# Patient Record
Sex: FEMALE | Race: WHITE | NOT HISPANIC OR LATINO | Employment: FULL TIME | URBAN - NONMETROPOLITAN AREA
[De-identification: names, ages, dates, MRNs, and addresses within clinical notes are randomized per-mention and may not be internally consistent; named-entity substitution may affect disease eponyms.]

---

## 2017-03-17 RX ORDER — AZITHROMYCIN 250 MG/1
TABLET, FILM COATED ORAL
Qty: 6 TABLET | Refills: 0 | Status: SHIPPED | OUTPATIENT
Start: 2017-03-17 | End: 2018-01-26

## 2017-07-31 RX ORDER — FLUCONAZOLE 150 MG/1
150 TABLET ORAL DAILY
Qty: 3 TABLET | Refills: 0 | Status: SHIPPED | OUTPATIENT
Start: 2017-07-31 | End: 2018-01-26

## 2018-01-26 ENCOUNTER — OFFICE VISIT (OUTPATIENT)
Dept: FAMILY MEDICINE CLINIC | Facility: CLINIC | Age: 38
End: 2018-01-26

## 2018-01-26 VITALS
WEIGHT: 193 LBS | BODY MASS INDEX: 32.95 KG/M2 | SYSTOLIC BLOOD PRESSURE: 124 MMHG | HEIGHT: 64 IN | DIASTOLIC BLOOD PRESSURE: 84 MMHG | HEART RATE: 80 BPM | TEMPERATURE: 97.9 F | OXYGEN SATURATION: 99 %

## 2018-01-26 DIAGNOSIS — F33.0 MILD EPISODE OF RECURRENT MAJOR DEPRESSIVE DISORDER (HCC): ICD-10-CM

## 2018-01-26 DIAGNOSIS — G47.00 INSOMNIA, UNSPECIFIED TYPE: ICD-10-CM

## 2018-01-26 DIAGNOSIS — F43.10 PTSD (POST-TRAUMATIC STRESS DISORDER): ICD-10-CM

## 2018-01-26 PROCEDURE — 99214 OFFICE O/P EST MOD 30 MIN: CPT | Performed by: NURSE PRACTITIONER

## 2018-01-26 RX ORDER — TRAZODONE HYDROCHLORIDE 50 MG/1
50 TABLET ORAL NIGHTLY
Qty: 30 TABLET | Refills: 1 | Status: SHIPPED | OUTPATIENT
Start: 2018-01-26 | End: 2018-05-01 | Stop reason: SDUPTHER

## 2018-01-26 RX ORDER — CITALOPRAM 10 MG/1
10 TABLET ORAL DAILY
Qty: 30 TABLET | Refills: 1 | Status: SHIPPED | OUTPATIENT
Start: 2018-01-26 | End: 2018-02-27 | Stop reason: SDUPTHER

## 2018-01-26 NOTE — PROGRESS NOTES
"Subjective   Priscilla Brooke is a 37 y.o. female.     HPI Comments: Patient is a 37 year old  female here for a follow-up concerning her depression. Patient reports that her depression is worsening and so is her PTSD. She states she has never reported that she has PTSD, but it she does, due to childhood trauma. States she has been having nightmares lately. States her  confirms this. She would like to try a medication that would help her sleep and continue to help her depression symptoms during the day.        The following portions of the patient's history were reviewed and updated as appropriate: allergies, current medications, past family history, past medical history, past social history, past surgical history and problem list.    Review of Systems   Constitutional: Negative.    HENT: Negative.    Eyes: Negative.    Respiratory: Negative.    Cardiovascular: Negative.    Gastrointestinal: Negative.    Endocrine: Negative.    Genitourinary: Negative.    Musculoskeletal: Negative.         NROM of all major joints   Skin: Negative.    Allergic/Immunologic: Negative.    Neurological: Negative.    Hematological: Negative.    Psychiatric/Behavioral: Positive for dysphoric mood and sleep disturbance. Negative for self-injury. The patient is not nervous/anxious.         Reports nightmares in the recent past, trouble sleeping and increase depression       Objective    Blood pressure 124/84, pulse 80, temperature 97.9 °F (36.6 °C), height 162.6 cm (64\"), weight 87.5 kg (193 lb), SpO2 99 %.    Physical Exam   Constitutional: She is oriented to person, place, and time. She appears well-developed and well-nourished. No distress.   HENT:   Head: Normocephalic.   Right Ear: External ear normal.   Left Ear: External ear normal.   Nose: Nose normal.   Mouth/Throat: No oropharyngeal exudate.   Eyes: Conjunctivae are normal.   Neck: Normal range of motion. No tracheal deviation present. No thyromegaly present. "   Cardiovascular: Normal rate, regular rhythm and normal heart sounds.    No murmur heard.  Pulmonary/Chest: Effort normal and breath sounds normal. No respiratory distress. She has no wheezes. She has no rales. She exhibits no tenderness.   Abdominal: Soft. Bowel sounds are normal. She exhibits no distension and no mass. There is no hepatosplenomegaly or splenomegaly. There is no tenderness. There is no rebound and no guarding. No hernia.   Musculoskeletal: Normal range of motion. She exhibits no edema or tenderness.   Lymphadenopathy:     She has no cervical adenopathy.        Right cervical: No superficial cervical, no deep cervical and no posterior cervical adenopathy present.       Left cervical: No superficial cervical, no deep cervical and no posterior cervical adenopathy present.   Neurological: She is alert and oriented to person, place, and time. Coordination and gait normal.   Skin: Skin is warm and dry. No rash noted.   Psychiatric: Her speech is normal and behavior is normal. Judgment and thought content normal. She exhibits a depressed mood.   visably sad with tears due to depression and recent nightmares   Nursing note and vitals reviewed.      Assessment/Plan   Priscilla was seen today for follow-up.    Diagnoses and all orders for this visit:    Mild episode of recurrent major depressive disorder  -     traZODone (DESYREL) 50 MG tablet; Take 1 tablet by mouth Every Night for 30 days.  -     citalopram (CELEXA) 10 MG tablet; Take 1 tablet by mouth Daily for 30 days.    PTSD (post-traumatic stress disorder)  -     citalopram (CELEXA) 10 MG tablet; Take 1 tablet by mouth Daily for 30 days.    Insomnia, unspecified type  -     traZODone (DESYREL) 50 MG tablet; Take 1 tablet by mouth Every Night for 30 days.      Celexa started today for treatment of her depression and to help with her PTSD, per request of patient.     Trazodone started for treatment of her insomnia.     Patient was encouraged to keep me  informed of any acute changes, lack of improvement, or any new concerning symptoms. Patient voiced understanding of all instructions and denied further questions.    Patient to RTC in 4 weeks or sooner if needed.

## 2018-01-29 ENCOUNTER — TELEPHONE (OUTPATIENT)
Dept: FAMILY MEDICINE CLINIC | Facility: CLINIC | Age: 38
End: 2018-01-29

## 2018-01-29 NOTE — TELEPHONE ENCOUNTER
Pharmacy called and wanted to know if you were ok with her taking the celexa and trazodone together

## 2018-02-02 DIAGNOSIS — J02.9 PHARYNGITIS, UNSPECIFIED ETIOLOGY: Primary | ICD-10-CM

## 2018-02-02 LAB
FLUAV AG NPH QL: NEGATIVE
FLUBV AG NPH QL IA: NEGATIVE
S PYO AG THROAT QL: NEGATIVE

## 2018-02-02 PROCEDURE — 87880 STREP A ASSAY W/OPTIC: CPT | Performed by: INTERNAL MEDICINE

## 2018-02-02 PROCEDURE — 87804 INFLUENZA ASSAY W/OPTIC: CPT | Performed by: INTERNAL MEDICINE

## 2018-02-02 PROCEDURE — 87081 CULTURE SCREEN ONLY: CPT | Performed by: INTERNAL MEDICINE

## 2018-02-04 LAB — BACTERIA SPEC AEROBE CULT: NORMAL

## 2018-02-07 ENCOUNTER — TELEPHONE (OUTPATIENT)
Dept: FAMILY MEDICINE CLINIC | Facility: CLINIC | Age: 38
End: 2018-02-07

## 2018-02-07 NOTE — TELEPHONE ENCOUNTER
----- Message from CRIS Ravi sent at 2/6/2018 11:57 AM EST -----  Let her know her GeneSight results are back and it seems Trazodone is ok for her. It states Celexa drug mechanism could be impacted, related to her Genotype, but does not say not to use it, just making us aware of that. If she does ok with it, we can keep it, but if not, Prozac Pristiq, Effexor, Cymbalta and Wellbutrin would be some other good choices for her.

## 2018-02-07 NOTE — TELEPHONE ENCOUNTER
----- Message from Stefani Zhang MA sent at 2/7/2018  5:16 PM EST -----      ----- Message -----     From: CRIS Ravi     Sent: 2/6/2018  11:57 AM       To: Stefani Zhang MA    Let her know her GeneSight results are back and it seems Trazodone is ok for her. It states Celexa drug mechanism could be impacted, related to her Genotype, but does not say not to use it, just making us aware of that. If she does ok with it, we can keep it, but if not, Prozac Pristiq, Effexor, Cymbalta and Wellbutrin would be some other good choices for her.        Left vm for pt to return call to office

## 2018-02-09 ENCOUNTER — TELEPHONE (OUTPATIENT)
Dept: FAMILY MEDICINE CLINIC | Facility: CLINIC | Age: 38
End: 2018-02-09

## 2018-02-09 NOTE — TELEPHONE ENCOUNTER
----- Message from Stefani Zhang MA sent at 2/7/2018  5:16 PM EST -----      ----- Message -----     From: CRIS Ravi     Sent: 2/6/2018  11:57 AM       To: Stefani Zhang MA    Let her know her GeneSight results are back and it seems Trazodone is ok for her. It states Celexa drug mechanism could be impacted, related to her Genotype, but does not say not to use it, just making us aware of that. If she does ok with it, we can keep it, but if not, Prozac Pristiq, Effexor, Cymbalta and Wellbutrin would be some other good choices for her.

## 2018-02-09 NOTE — TELEPHONE ENCOUNTER
Pt notified of results and she stated that she is not having any trouble with the medication so she would just stay on what she is taking

## 2018-02-27 ENCOUNTER — OFFICE VISIT (OUTPATIENT)
Dept: FAMILY MEDICINE CLINIC | Facility: CLINIC | Age: 38
End: 2018-02-27

## 2018-02-27 VITALS
DIASTOLIC BLOOD PRESSURE: 64 MMHG | SYSTOLIC BLOOD PRESSURE: 100 MMHG | OXYGEN SATURATION: 98 % | HEART RATE: 68 BPM | BODY MASS INDEX: 32.44 KG/M2 | WEIGHT: 189 LBS

## 2018-02-27 DIAGNOSIS — F33.0 MILD EPISODE OF RECURRENT MAJOR DEPRESSIVE DISORDER (HCC): ICD-10-CM

## 2018-02-27 DIAGNOSIS — J02.9 PHARYNGITIS, UNSPECIFIED ETIOLOGY: ICD-10-CM

## 2018-02-27 DIAGNOSIS — F43.10 PTSD (POST-TRAUMATIC STRESS DISORDER): ICD-10-CM

## 2018-02-27 PROCEDURE — 99214 OFFICE O/P EST MOD 30 MIN: CPT | Performed by: NURSE PRACTITIONER

## 2018-02-27 RX ORDER — CITALOPRAM 10 MG/1
10 TABLET ORAL DAILY
Qty: 30 TABLET | Refills: 2 | Status: SHIPPED | OUTPATIENT
Start: 2018-02-27 | End: 2018-05-01 | Stop reason: DRUGHIGH

## 2018-02-27 NOTE — PROGRESS NOTES
Subjective   Priscilla Brooke is a 37 y.o. female.     HPI Comments: Patient is here today for follow up on her depression and PTSD since starting Celexa and Trazodone. She states she is sleeping much better and she has not had anymore nightmares but she feels tired a lot.     ACUTE PHARYNGITIS:  She states she had a sore throat and felt really fatigued last week, so she went to the [facility]. She states she was diagnosed with Strept throat, but has finished her antibiotics and feels better. Her throat is no longer sore.    The following portions of the patient's history were reviewed and updated as appropriate: allergies, current medications, past family history, past medical history, past social history, past surgical history and problem list.    Review of Systems   Constitutional: Negative.    HENT: Negative.    Eyes: Negative.    Respiratory: Negative.    Cardiovascular: Negative.    Gastrointestinal: Negative.  Negative for constipation.   Genitourinary: Negative.    Musculoskeletal: Negative.    Skin: Negative.    Allergic/Immunologic: Negative.    Neurological: Negative for dizziness, syncope, weakness and numbness.   Hematological: Negative for adenopathy.   Psychiatric/Behavioral: Negative for confusion, hallucinations and suicidal ideas. The patient is not nervous/anxious.         Depression, PTSD, night emmanuel improved with Celexa       Objective   Physical Exam   Constitutional: She is oriented to person, place, and time. She appears well-developed and well-nourished. No distress.   HENT:   Head: Normocephalic.   Right Ear: External ear normal.   Left Ear: External ear normal.   Nose: Nose normal.   Mouth/Throat: Oropharynx is clear and moist. No oropharyngeal exudate.   Eyes: Conjunctivae are normal. Pupils are equal, round, and reactive to light.   Neck: Normal range of motion. Neck supple. No tracheal deviation present. No thyromegaly present.   Cardiovascular: Normal rate, regular rhythm and normal  heart sounds.    No murmur heard.  Pulmonary/Chest: Effort normal and breath sounds normal. No respiratory distress. She has no wheezes. She has no rales. She exhibits no tenderness.   Abdominal: Soft. Bowel sounds are normal. She exhibits no distension and no mass. There is no hepatosplenomegaly or splenomegaly. There is no tenderness. There is no rebound and no guarding. No hernia.   Musculoskeletal: Normal range of motion. She exhibits no edema or tenderness.   NROM all major joints   Neurological: She is alert and oriented to person, place, and time. Coordination and gait normal.   Skin: Skin is warm and dry. No rash noted.   Psychiatric: She has a normal mood and affect. Her behavior is normal. Judgment and thought content normal.   Nursing note and vitals reviewed.      Assessment/Plan   Priscilla was seen today for follow-up.    Diagnoses and all orders for this visit:    Mild episode of recurrent major depressive disorder  -     citalopram (CELEXA) 10 MG tablet; Take 1 tablet by mouth Daily for 30 days.    PTSD (post-traumatic stress disorder)  -     citalopram (CELEXA) 10 MG tablet; Take 1 tablet by mouth Daily for 30 days.    Pharyngitis, unspecified etiology  -     Influenza Antigen, Rapid - Swab, Nasopharynx      Celexa and Trazodone continue for there depression and PTSD. Patient advised to take the Celexa at night, since it is making her sleepy during the day, and only use the Trazodone prn. She was also advised she could cut the Trazodone in half.    Pharyngitis has resolved at this time.     Patient to RTC in 2 months.

## 2018-05-01 ENCOUNTER — OFFICE VISIT (OUTPATIENT)
Dept: FAMILY MEDICINE CLINIC | Facility: CLINIC | Age: 38
End: 2018-05-01

## 2018-05-01 VITALS
OXYGEN SATURATION: 98 % | HEIGHT: 64 IN | WEIGHT: 194 LBS | HEART RATE: 74 BPM | TEMPERATURE: 98.1 F | BODY MASS INDEX: 33.12 KG/M2 | SYSTOLIC BLOOD PRESSURE: 120 MMHG | DIASTOLIC BLOOD PRESSURE: 80 MMHG

## 2018-05-01 DIAGNOSIS — R53.82 CHRONIC FATIGUE: ICD-10-CM

## 2018-05-01 DIAGNOSIS — Z13.220 LIPID SCREENING: ICD-10-CM

## 2018-05-01 DIAGNOSIS — F33.0 MILD EPISODE OF RECURRENT MAJOR DEPRESSIVE DISORDER (HCC): ICD-10-CM

## 2018-05-01 DIAGNOSIS — Z86.2 HISTORY OF IRON DEFICIENCY ANEMIA: ICD-10-CM

## 2018-05-01 DIAGNOSIS — F43.10 PTSD (POST-TRAUMATIC STRESS DISORDER): ICD-10-CM

## 2018-05-01 DIAGNOSIS — E55.9 VITAMIN D DEFICIENCY: ICD-10-CM

## 2018-05-01 DIAGNOSIS — G47.00 INSOMNIA, UNSPECIFIED TYPE: ICD-10-CM

## 2018-05-01 PROCEDURE — 99214 OFFICE O/P EST MOD 30 MIN: CPT | Performed by: NURSE PRACTITIONER

## 2018-05-01 RX ORDER — TRAZODONE HYDROCHLORIDE 50 MG/1
50 TABLET ORAL NIGHTLY
Qty: 30 TABLET | Refills: 2 | Status: SHIPPED | OUTPATIENT
Start: 2018-05-01 | End: 2018-08-09 | Stop reason: SDUPTHER

## 2018-05-01 RX ORDER — CITALOPRAM 20 MG/1
20 TABLET ORAL DAILY
Qty: 30 TABLET | Refills: 2 | Status: SHIPPED | OUTPATIENT
Start: 2018-05-01 | End: 2018-05-31

## 2018-05-01 RX ORDER — CITALOPRAM 10 MG/1
10 TABLET ORAL DAILY
Qty: 30 TABLET | Refills: 2 | Status: CANCELLED | OUTPATIENT
Start: 2018-05-01 | End: 2018-05-31

## 2018-05-01 NOTE — PROGRESS NOTES
Subjective   Priscilla Brooke is a 37 y.o. female.     Patient is here today for follow up on her depression, PTSD and insomnia. She states she tolerating the medications very well with no side effects. They have been working very well for her but for the past couple weeks, she has been feeling more irritated.  She is thinking her Celexa may need increased a little. She states she only takes 25 mg of the Trazodone most of the time and it is working well.     Patient is also here for complaints of feeling fatigued all the time. She states she has not had her labs checked for a while, so would like to get everything checked. She has a history of iron deficiency and low Vitamin D, and would like them checked, to see if this is why she feels so tired all the time. She would also like her cholesterol checked.         The following portions of the patient's history were reviewed and updated as appropriate: allergies, current medications, past family history, past medical history, past social history, past surgical history and problem list.    Review of Systems   Constitutional: Positive for fatigue. Negative for activity change, appetite change, chills, diaphoresis, fever and unexpected weight change.   HENT: Negative.    Eyes: Negative.    Respiratory: Negative.    Cardiovascular: Negative.    Gastrointestinal: Negative.    Genitourinary: Negative.    Musculoskeletal: Negative.    Skin: Negative.    Allergic/Immunologic: Negative.    Neurological: Negative for dizziness, syncope, weakness, numbness and headaches.   Hematological: Negative for adenopathy.   Psychiatric/Behavioral: Positive for agitation. Negative for confusion, decreased concentration, dysphoric mood, sleep disturbance and suicidal ideas. The patient is not nervous/anxious and is not hyperactive.      Vitals:    05/01/18 1650   BP: 120/80   Pulse: 74   Temp: 98.1 °F (36.7 °C)   SpO2: 98%       Objective   Physical Exam   Constitutional: She is  oriented to person, place, and time. She appears well-developed and well-nourished. No distress.   HENT:   Head: Normocephalic.   Right Ear: External ear normal.   Left Ear: External ear normal.   Nose: Nose normal.   Mouth/Throat: Oropharynx is clear and moist. No oropharyngeal exudate.   Eyes: Conjunctivae are normal.   Neck: Normal range of motion. Neck supple. No tracheal deviation present. No thyromegaly present.   Cardiovascular: Normal rate, regular rhythm and normal heart sounds.    No murmur heard.  Pulmonary/Chest: Effort normal and breath sounds normal. No respiratory distress.   Abdominal: Soft. Bowel sounds are normal. She exhibits no distension. There is no hepatosplenomegaly or splenomegaly. There is no tenderness. There is no guarding.   Musculoskeletal: Normal range of motion. She exhibits no edema or tenderness.   NROM all major joints   Neurological: She is alert and oriented to person, place, and time. Coordination and gait normal.   Skin: Skin is warm and dry. No rash noted.   Psychiatric: She has a normal mood and affect. Her behavior is normal. Judgment and thought content normal.   Nursing note and vitals reviewed.      Assessment/Plan   Priscilla was seen today for follow-up.    Diagnoses and all orders for this visit:    Mild episode of recurrent major depressive disorder  -     traZODone (DESYREL) 50 MG tablet; Take 1 tablet by mouth Every Night for 30 days.  -     citalopram (CeleXA) 20 MG tablet; Take 1 tablet by mouth Daily for 30 days.  -     CBC (No Diff); Future  -     Comprehensive Metabolic Panel; Future    Insomnia, unspecified type  -     traZODone (DESYREL) 50 MG tablet; Take 1 tablet by mouth Every Night for 30 days.    PTSD (post-traumatic stress disorder)  -     citalopram (CeleXA) 20 MG tablet; Take 1 tablet by mouth Daily for 30 days.    Chronic fatigue  -     CBC (No Diff); Future  -     Comprehensive Metabolic Panel; Future  -     TSH; Future  -     T4, Free; Future  -      Vitamin B12; Future    History of iron deficiency anemia  -     CBC (No Diff); Future  -     Iron    Lipid screening  -     Lipid Panel; Future    Vitamin D deficiency  -     Vitamin D 25 Hydroxy; Future    Other orders  -     Cancel: citalopram (CELEXA) 10 MG tablet; Take 1 tablet by mouth Daily for 30 days.      Celexa increased to 20 mg today, for her increased agitation.    Screening labs ordered and patient to have drawn at [facility], fasting. I will contact patient regarding test results and provide instructions regarding any necessary changes in plan of care.    Patient was encouraged to keep me informed of any acute changes, lack of improvement, or any new concerning symptoms.Patient voiced understanding of all instructions and denied further questions.    Patient to RTC for her 3 month follow up and prn.

## 2018-05-02 ENCOUNTER — LAB (OUTPATIENT)
Dept: LAB | Facility: HOSPITAL | Age: 38
End: 2018-05-02

## 2018-05-02 DIAGNOSIS — E55.9 VITAMIN D DEFICIENCY: ICD-10-CM

## 2018-05-02 DIAGNOSIS — Z86.2 HISTORY OF IRON DEFICIENCY ANEMIA: ICD-10-CM

## 2018-05-02 DIAGNOSIS — R53.82 CHRONIC FATIGUE: ICD-10-CM

## 2018-05-02 DIAGNOSIS — Z13.220 LIPID SCREENING: ICD-10-CM

## 2018-05-02 DIAGNOSIS — F33.0 MILD EPISODE OF RECURRENT MAJOR DEPRESSIVE DISORDER (HCC): ICD-10-CM

## 2018-05-02 LAB
25(OH)D3 SERPL-MCNC: 20.4 NG/ML
ALBUMIN SERPL-MCNC: 4.1 G/DL (ref 3.5–5)
ALBUMIN/GLOB SERPL: 1.4 G/DL (ref 1–2)
ALP SERPL-CCNC: 69 U/L (ref 38–126)
ALT SERPL W P-5'-P-CCNC: 27 U/L (ref 13–69)
ANION GAP SERPL CALCULATED.3IONS-SCNC: 14.5 MMOL/L (ref 10–20)
AST SERPL-CCNC: 17 U/L (ref 15–46)
BILIRUB SERPL-MCNC: 0.4 MG/DL (ref 0.2–1.3)
BUN BLD-MCNC: 16 MG/DL (ref 7–20)
BUN/CREAT SERPL: 20 (ref 7.1–23.5)
CALCIUM SPEC-SCNC: 9.1 MG/DL (ref 8.4–10.2)
CHLORIDE SERPL-SCNC: 102 MMOL/L (ref 98–107)
CHOLEST SERPL-MCNC: 212 MG/DL (ref 0–199)
CO2 SERPL-SCNC: 29 MMOL/L (ref 26–30)
CREAT BLD-MCNC: 0.8 MG/DL (ref 0.6–1.3)
DEPRECATED RDW RBC AUTO: 41.1 FL (ref 37–54)
ERYTHROCYTE [DISTWIDTH] IN BLOOD BY AUTOMATED COUNT: 12.6 % (ref 11.5–14.5)
GFR SERPL CREATININE-BSD FRML MDRD: 81 ML/MIN/1.73
GLOBULIN UR ELPH-MCNC: 3 GM/DL
GLUCOSE BLD-MCNC: 86 MG/DL (ref 74–98)
HCT VFR BLD AUTO: 39.9 % (ref 37–47)
HDLC SERPL-MCNC: 75 MG/DL (ref 40–60)
HGB BLD-MCNC: 13.1 G/DL (ref 12–16)
IRON 24H UR-MRATE: 107 MCG/DL (ref 37–181)
LDLC SERPL CALC-MCNC: 122 MG/DL (ref 0–99)
LDLC/HDLC SERPL: 1.63 {RATIO}
MCH RBC QN AUTO: 29.4 PG (ref 27–31)
MCHC RBC AUTO-ENTMCNC: 32.8 G/DL (ref 30–37)
MCV RBC AUTO: 89.5 FL (ref 81–99)
PLATELET # BLD AUTO: 294 10*3/MM3 (ref 130–400)
PMV BLD AUTO: 10.3 FL (ref 6–12)
POTASSIUM BLD-SCNC: 4.5 MMOL/L (ref 3.5–5.1)
PROT SERPL-MCNC: 7.1 G/DL (ref 6.3–8.2)
RBC # BLD AUTO: 4.46 10*6/MM3 (ref 4.2–5.4)
SODIUM BLD-SCNC: 141 MMOL/L (ref 137–145)
T4 FREE SERPL-MCNC: 1 NG/DL (ref 0.78–2.19)
TRIGL SERPL-MCNC: 74 MG/DL
TSH SERPL DL<=0.05 MIU/L-ACNC: 1.54 MIU/ML (ref 0.47–4.68)
VIT B12 BLD-MCNC: 235 PG/ML (ref 239–931)
VLDLC SERPL-MCNC: 14.8 MG/DL
WBC NRBC COR # BLD: 6.52 10*3/MM3 (ref 4.8–10.8)

## 2018-05-02 PROCEDURE — 83540 ASSAY OF IRON: CPT | Performed by: NURSE PRACTITIONER

## 2018-05-02 PROCEDURE — 84439 ASSAY OF FREE THYROXINE: CPT

## 2018-05-02 PROCEDURE — 82607 VITAMIN B-12: CPT

## 2018-05-02 PROCEDURE — 82306 VITAMIN D 25 HYDROXY: CPT

## 2018-05-02 PROCEDURE — 80053 COMPREHEN METABOLIC PANEL: CPT

## 2018-05-02 PROCEDURE — 85027 COMPLETE CBC AUTOMATED: CPT

## 2018-05-02 PROCEDURE — 36415 COLL VENOUS BLD VENIPUNCTURE: CPT

## 2018-05-02 PROCEDURE — 80061 LIPID PANEL: CPT

## 2018-05-02 PROCEDURE — 84443 ASSAY THYROID STIM HORMONE: CPT

## 2018-06-07 ENCOUNTER — TELEPHONE (OUTPATIENT)
Dept: FAMILY MEDICINE CLINIC | Facility: CLINIC | Age: 38
End: 2018-06-07

## 2018-06-07 NOTE — TELEPHONE ENCOUNTER
Pt called office stated that she was wanting to be seen because she had been having heart burn for two weeks and now was having arm tingling and numbness. I notified pt that Dr Gomez had already finished clinic this morning and her schedule was full for in the morning

## 2018-08-09 ENCOUNTER — OFFICE VISIT (OUTPATIENT)
Dept: FAMILY MEDICINE CLINIC | Facility: CLINIC | Age: 38
End: 2018-08-09

## 2018-08-09 VITALS
BODY MASS INDEX: 33.12 KG/M2 | HEIGHT: 64 IN | RESPIRATION RATE: 12 BRPM | DIASTOLIC BLOOD PRESSURE: 76 MMHG | OXYGEN SATURATION: 98 % | WEIGHT: 194 LBS | TEMPERATURE: 99 F | SYSTOLIC BLOOD PRESSURE: 118 MMHG | HEART RATE: 72 BPM

## 2018-08-09 DIAGNOSIS — F33.0 MILD EPISODE OF RECURRENT MAJOR DEPRESSIVE DISORDER (HCC): ICD-10-CM

## 2018-08-09 DIAGNOSIS — E66.09 CLASS 1 OBESITY DUE TO EXCESS CALORIES WITHOUT SERIOUS COMORBIDITY WITH BODY MASS INDEX (BMI) OF 33.0 TO 33.9 IN ADULT: ICD-10-CM

## 2018-08-09 DIAGNOSIS — G47.00 INSOMNIA, UNSPECIFIED TYPE: ICD-10-CM

## 2018-08-09 DIAGNOSIS — E53.8 VITAMIN B 12 DEFICIENCY: ICD-10-CM

## 2018-08-09 DIAGNOSIS — F43.10 PTSD (POST-TRAUMATIC STRESS DISORDER): ICD-10-CM

## 2018-08-09 DIAGNOSIS — E55.9 VITAMIN D DEFICIENCY: ICD-10-CM

## 2018-08-09 PROBLEM — E66.811 CLASS 1 OBESITY DUE TO EXCESS CALORIES WITHOUT SERIOUS COMORBIDITY WITH BODY MASS INDEX (BMI) OF 33.0 TO 33.9 IN ADULT: Status: ACTIVE | Noted: 2018-08-09

## 2018-08-09 PROCEDURE — 99214 OFFICE O/P EST MOD 30 MIN: CPT | Performed by: NURSE PRACTITIONER

## 2018-08-09 RX ORDER — CITALOPRAM 20 MG/1
20 TABLET ORAL DAILY
Qty: 30 TABLET | Refills: 5 | Status: SHIPPED | OUTPATIENT
Start: 2018-08-09 | End: 2018-09-08

## 2018-08-09 RX ORDER — TRAZODONE HYDROCHLORIDE 50 MG/1
50 TABLET ORAL NIGHTLY
Qty: 30 TABLET | Refills: 5 | Status: SHIPPED | OUTPATIENT
Start: 2018-08-09 | End: 2018-12-27 | Stop reason: SDUPTHER

## 2018-08-09 RX ORDER — CITALOPRAM 20 MG/1
20 TABLET ORAL DAILY
COMMUNITY
End: 2018-08-09 | Stop reason: SDUPTHER

## 2018-08-09 RX ORDER — PHENTERMINE HYDROCHLORIDE 37.5 MG/1
37.5 CAPSULE ORAL EVERY MORNING
Qty: 30 CAPSULE | Refills: 0 | Status: SHIPPED | OUTPATIENT
Start: 2018-08-09 | End: 2018-09-06 | Stop reason: SDUPTHER

## 2018-08-09 NOTE — PROGRESS NOTES
Subjective   Priscilla Brooke is a 37 y.o. female.     Patient is here today for follow up on her chronic conditions:    Depression/PTSD/Insomnia:   She states she is tolerating the medications very well with no side effects. The Celexa is working very well for her since it was increased. She states she only takes 25 mg of the Trazodone most of the time and it is working well.     Vitamin D deficiency  She took the prescription Vitamin D that was sent in for her but she has not been taking OTC Vitamin D.    Vitamin B 12 deficiency  She was notified after her last visit, that her Vitamin B 12 was low. She decided not to take Vitamin B 12 injections. She states she is so tired all the time though, that if is still low , she is going to start B 12 injections.    Obesity  She has been doing weight watchers and going to the gym 3-4 days a week, and still can not lose weight. She lost a few pounds at first, but now is at a stand still. She is not sure if it is the Prozac is the cause of this, but either way, she has to take the Prozac. She states she would like to take something to help her with her weight loss. She has never tried anything.           The following portions of the patient's history were reviewed and updated as appropriate: allergies, current medications, past family history, past medical history, past social history, past surgical history and problem list.    Review of Systems   Constitutional: Positive for fatigue. Negative for activity change, appetite change, chills, diaphoresis, fever and unexpected weight change.   HENT: Negative.    Eyes: Negative.    Respiratory: Negative.    Cardiovascular: Negative.    Gastrointestinal: Negative.    Endocrine: Negative.    Genitourinary: Negative.    Musculoskeletal: Negative.    Skin: Negative.    Neurological: Negative for dizziness, syncope, weakness, numbness and headaches.   Hematological: Negative for adenopathy.   Psychiatric/Behavioral: Negative for  agitation, confusion, dysphoric mood, self-injury, sleep disturbance and suicidal ideas. The patient is not nervous/anxious.         Depression and PTSD controlled      Vitals:    08/09/18 1630   BP: 118/76   Pulse: 72   Resp: 12   Temp: 99 °F (37.2 °C)   SpO2: 98%     Objective   Physical Exam   Constitutional: She is oriented to person, place, and time. She appears well-developed and well-nourished. No distress.   HENT:   Head: Normocephalic.   Right Ear: External ear normal.   Left Ear: External ear normal.   Nose: Nose normal.   Eyes: Conjunctivae are normal.   Neck: Normal range of motion. Neck supple.   Cardiovascular: Normal rate, regular rhythm, normal heart sounds and intact distal pulses.    No murmur heard.  Pulmonary/Chest: Effort normal and breath sounds normal. No respiratory distress. She has no wheezes. She has no rales. She exhibits no tenderness.   Abdominal: Soft. She exhibits no distension. There is no hepatosplenomegaly or splenomegaly. There is no tenderness. There is no guarding.   Musculoskeletal: Normal range of motion. She exhibits no edema or tenderness.   NROM all major joints   Neurological: She is alert and oriented to person, place, and time. Coordination and gait normal.   Skin: Skin is warm and dry. No rash noted.   Psychiatric: She has a normal mood and affect. Her behavior is normal. Judgment and thought content normal.   Nursing note and vitals reviewed.      Assessment/Plan   Priscilla was seen today for depression.    Diagnoses and all orders for this visit:    Mild episode of recurrent major depressive disorder (CMS/HCC)  -     citalopram (CeleXA) 20 MG tablet; Take 1 tablet by mouth Daily for 30 days.  -     traZODone (DESYREL) 50 MG tablet; Take 1 tablet by mouth Every Night for 30 days.  -     Comprehensive Metabolic Panel; Future    PTSD (post-traumatic stress disorder)  -     citalopram (CeleXA) 20 MG tablet; Take 1 tablet by mouth Daily for 30 days.  -     Comprehensive  Metabolic Panel; Future    Insomnia, unspecified type  -     traZODone (DESYREL) 50 MG tablet; Take 1 tablet by mouth Every Night for 30 days.  -     Comprehensive Metabolic Panel; Future    Class 1 obesity due to excess calories without serious comorbidity with body mass index (BMI) of 33.0 to 33.9 in adult  -     Comprehensive Metabolic Panel; Future  -     phentermine 37.5 MG capsule; Take 1 capsule by mouth Every Morning for 30 days.    Vitamin D deficiency  -     Vitamin D 25 Hydroxy; Future    Vitamin B 12 deficiency  -     Vitamin B12; Future      Patient to continue Prozac and Trazodone as directed for her depression, PTSD and insomnia.    Patient advised to continue watching her calories and portions sizes, as well as exercising 3-4 times a week. Adipex prescribed to assist with her weight loss. She was educated about possible side effects.    Labs ordered today,  for medication management and screening of her Vitamin deficiencies. Patient will have performed tomorrow and [facility]. I will contact patient regarding test results and provide instructions regarding any necessary changes in plan of care.    Patient was encouraged to keep me informed of any acute changes, lack of improvement, or any new concerning symptoms.Patient voiced understanding of all instructions and denied further questions.    Patient to RTC in 4 weeks or sooner if needed.

## 2018-08-10 ENCOUNTER — LAB (OUTPATIENT)
Dept: LAB | Facility: HOSPITAL | Age: 38
End: 2018-08-10

## 2018-08-10 DIAGNOSIS — G47.00 INSOMNIA, UNSPECIFIED TYPE: ICD-10-CM

## 2018-08-10 DIAGNOSIS — E53.8 VITAMIN B 12 DEFICIENCY: ICD-10-CM

## 2018-08-10 DIAGNOSIS — E66.09 CLASS 1 OBESITY DUE TO EXCESS CALORIES WITHOUT SERIOUS COMORBIDITY WITH BODY MASS INDEX (BMI) OF 33.0 TO 33.9 IN ADULT: ICD-10-CM

## 2018-08-10 DIAGNOSIS — E55.9 VITAMIN D DEFICIENCY: ICD-10-CM

## 2018-08-10 DIAGNOSIS — F43.10 PTSD (POST-TRAUMATIC STRESS DISORDER): ICD-10-CM

## 2018-08-10 DIAGNOSIS — F33.0 MILD EPISODE OF RECURRENT MAJOR DEPRESSIVE DISORDER (HCC): ICD-10-CM

## 2018-08-10 LAB
25(OH)D3 SERPL-MCNC: 32.5 NG/ML
ALBUMIN SERPL-MCNC: 4.2 G/DL (ref 3.5–5)
ALBUMIN/GLOB SERPL: 1.4 G/DL (ref 1–2)
ALP SERPL-CCNC: 68 U/L (ref 38–126)
ALT SERPL W P-5'-P-CCNC: 19 U/L (ref 13–69)
ANION GAP SERPL CALCULATED.3IONS-SCNC: 12.2 MMOL/L (ref 10–20)
AST SERPL-CCNC: 20 U/L (ref 15–46)
BILIRUB SERPL-MCNC: 0.4 MG/DL (ref 0.2–1.3)
BUN BLD-MCNC: 15 MG/DL (ref 7–20)
BUN/CREAT SERPL: 18.8 (ref 7.1–23.5)
CALCIUM SPEC-SCNC: 9 MG/DL (ref 8.4–10.2)
CHLORIDE SERPL-SCNC: 104 MMOL/L (ref 98–107)
CO2 SERPL-SCNC: 28 MMOL/L (ref 26–30)
CREAT BLD-MCNC: 0.8 MG/DL (ref 0.6–1.3)
GFR SERPL CREATININE-BSD FRML MDRD: 81 ML/MIN/1.73
GLOBULIN UR ELPH-MCNC: 3 GM/DL
GLUCOSE BLD-MCNC: 82 MG/DL (ref 74–98)
POTASSIUM BLD-SCNC: 4.2 MMOL/L (ref 3.5–5.1)
PROT SERPL-MCNC: 7.2 G/DL (ref 6.3–8.2)
SODIUM BLD-SCNC: 140 MMOL/L (ref 137–145)
VIT B12 BLD-MCNC: 340 PG/ML (ref 239–931)

## 2018-08-10 PROCEDURE — 82306 VITAMIN D 25 HYDROXY: CPT

## 2018-08-10 PROCEDURE — 82607 VITAMIN B-12: CPT

## 2018-08-10 PROCEDURE — 80053 COMPREHEN METABOLIC PANEL: CPT

## 2018-08-10 PROCEDURE — 36415 COLL VENOUS BLD VENIPUNCTURE: CPT

## 2018-08-13 DIAGNOSIS — E53.8 VITAMIN B 12 DEFICIENCY: Primary | ICD-10-CM

## 2018-08-13 RX ORDER — CYANOCOBALAMIN 1000 UG/ML
1000 INJECTION, SOLUTION INTRAMUSCULAR; SUBCUTANEOUS
Qty: 30 ML | Refills: 1 | Status: SHIPPED | OUTPATIENT
Start: 2018-08-13 | End: 2019-08-20 | Stop reason: SDUPTHER

## 2018-09-06 ENCOUNTER — OFFICE VISIT (OUTPATIENT)
Dept: FAMILY MEDICINE CLINIC | Facility: CLINIC | Age: 38
End: 2018-09-06

## 2018-09-06 VITALS
OXYGEN SATURATION: 99 % | TEMPERATURE: 98.1 F | BODY MASS INDEX: 32.95 KG/M2 | DIASTOLIC BLOOD PRESSURE: 80 MMHG | WEIGHT: 193 LBS | HEIGHT: 64 IN | SYSTOLIC BLOOD PRESSURE: 122 MMHG | HEART RATE: 88 BPM | RESPIRATION RATE: 12 BRPM

## 2018-09-06 DIAGNOSIS — E66.09 CLASS 1 OBESITY DUE TO EXCESS CALORIES WITHOUT SERIOUS COMORBIDITY WITH BODY MASS INDEX (BMI) OF 33.0 TO 33.9 IN ADULT: ICD-10-CM

## 2018-09-06 DIAGNOSIS — M54.32 SCIATICA OF LEFT SIDE: ICD-10-CM

## 2018-09-06 PROCEDURE — 99214 OFFICE O/P EST MOD 30 MIN: CPT | Performed by: NURSE PRACTITIONER

## 2018-09-06 PROCEDURE — 96372 THER/PROPH/DIAG INJ SC/IM: CPT | Performed by: NURSE PRACTITIONER

## 2018-09-06 RX ORDER — NAPROXEN 500 MG/1
500 TABLET ORAL 2 TIMES DAILY WITH MEALS
Qty: 60 TABLET | Refills: 2 | Status: SHIPPED | OUTPATIENT
Start: 2018-09-06 | End: 2018-09-06 | Stop reason: SDUPTHER

## 2018-09-06 RX ORDER — NAPROXEN 500 MG/1
500 TABLET ORAL 2 TIMES DAILY WITH MEALS
Qty: 60 TABLET | Refills: 2 | Status: SHIPPED | OUTPATIENT
Start: 2018-09-06 | End: 2019-11-27 | Stop reason: SDUPTHER

## 2018-09-06 RX ORDER — PHENTERMINE HYDROCHLORIDE 37.5 MG/1
37.5 CAPSULE ORAL EVERY MORNING
Qty: 30 CAPSULE | Refills: 2 | Status: SHIPPED | OUTPATIENT
Start: 2018-09-06 | End: 2018-10-06

## 2018-09-06 RX ORDER — METHYLPREDNISOLONE ACETATE 80 MG/ML
120 INJECTION, SUSPENSION INTRA-ARTICULAR; INTRALESIONAL; INTRAMUSCULAR; SOFT TISSUE ONCE
Status: COMPLETED | OUTPATIENT
Start: 2018-09-06 | End: 2018-09-06

## 2018-09-06 RX ORDER — KETOROLAC TROMETHAMINE 30 MG/ML
60 INJECTION, SOLUTION INTRAMUSCULAR; INTRAVENOUS ONCE
Status: COMPLETED | OUTPATIENT
Start: 2018-09-06 | End: 2018-09-06

## 2018-09-06 RX ADMIN — KETOROLAC TROMETHAMINE 60 MG: 30 INJECTION, SOLUTION INTRAMUSCULAR; INTRAVENOUS at 17:27

## 2018-09-06 RX ADMIN — METHYLPREDNISOLONE ACETATE 120 MG: 80 INJECTION, SUSPENSION INTRA-ARTICULAR; INTRALESIONAL; INTRAMUSCULAR; SOFT TISSUE at 17:28

## 2018-09-06 NOTE — PROGRESS NOTES
Subjective   Priscilla Brooke is a 37 y.o. female.     Patient is here today for follow up on her obesity. She has been taking the Adipex every day in the morning. She states it helps it decrease her appetite and she does not eat as much. Has been exercising 3 days per week. Continues to eat a healthy diet. She has not lost much weight, but she can tell she has lost some inches and toned up some. She is drinking more water.    Sciatica  Reporting a flare of her sciatica today. Pain is located in her left lower back and radiates down to her posterior mid thigh, describes pain as burning. Has taken ibuprofen but no relief. Has been to the chiropractor in the past, which gave her minimal relief and stated she would like to return. Denies loss of bowel and bladder control.              The following portions of the patient's history were reviewed and updated as appropriate: allergies, current medications, past family history, past medical history, past social history, past surgical history and problem list.    Review of Systems   Constitutional: Negative.    HENT: Negative.    Eyes: Negative.    Respiratory: Negative.    Cardiovascular: Negative.    Gastrointestinal: Negative.    Genitourinary: Negative.    Musculoskeletal: Negative.         Burning pain from left lower back to left mid posterior thigh   Skin: Negative.    Allergic/Immunologic: Negative.    Neurological: Negative for dizziness, syncope, weakness and numbness.        Denies loss of bowel or bladder dysfunction   Hematological: Negative for adenopathy.   Psychiatric/Behavioral: Negative for confusion and suicidal ideas. The patient is not nervous/anxious.      Vitals:    09/06/18 1639   BP: 122/80   Pulse: 88   Resp: 12   Temp: 98.1 °F (36.7 °C)   SpO2: 99%       Objective   Physical Exam   Constitutional: She is oriented to person, place, and time. She appears well-developed and well-nourished. No distress.   HENT:   Head: Normocephalic.   Right  Ear: External ear normal.   Left Ear: External ear normal.   Nose: Nose normal.   Eyes: Pupils are equal, round, and reactive to light. Conjunctivae are normal.   Neck: Normal range of motion. Neck supple. No tracheal deviation present. No thyromegaly present.   Cardiovascular: Normal rate, regular rhythm, normal heart sounds and intact distal pulses.    No murmur heard.  Pulmonary/Chest: Effort normal and breath sounds normal. No respiratory distress. She has no wheezes. She has no rales. She exhibits no tenderness.   Abdominal: Soft. Bowel sounds are normal. She exhibits no distension and no mass. There is no hepatosplenomegaly or splenomegaly. There is no tenderness. There is no rebound and no guarding. No hernia.   Musculoskeletal: Normal range of motion. She exhibits no edema or tenderness.   Neurological: She is alert and oriented to person, place, and time. Coordination and gait normal.   Skin: Skin is warm and dry. No rash noted.   Psychiatric: She has a normal mood and affect. Her behavior is normal. Judgment and thought content normal.   Nursing note and vitals reviewed.      Assessment/Plan   Priscilla was seen today for follow-up.    Diagnoses and all orders for this visit:    Class 1 obesity due to excess calories without serious comorbidity with body mass index (BMI) of 33.0 to 33.9 in adult  -     phentermine 37.5 MG capsule; Take 1 capsule by mouth Every Morning for 30 days.    Sciatica of left side  -     methylPREDNISolone acetate (DEPO-medrol) injection 120 mg; Inject 1.5 mL into the appropriate muscle as directed by prescriber 1 (One) Time.  -     ketorolac (TORADOL) injection 60 mg; Inject 2 mL into the appropriate muscle as directed by prescriber 1 (One) Time.  -     naproxen (NAPROSYN) 500 MG tablet; Take 1 tablet by mouth 2 (Two) Times a Day With Meals.    Other orders  -     Discontinue: naproxen (NAPROSYN) 500 MG tablet; Take 1 tablet by mouth 2 (Two) Times a Day With Meals.       Patient  congratulated on her weight loss and life style changes. Patient encouraged to continue this. Adipex refilled for treatment of her obesity.     Toradol and Medrol given in the clinic today. Naproxen prescribed and patient advised to alternate heat and ice. She is also going to make an appointment with her Chiropractor.     Patient was encouraged to keep me informed of any acute changes, lack of improvement, or any new concerning symptoms. Patient voiced understanding of all instructions and denied further questions.    Patient to RTC in 3 months for follow up and prn.

## 2018-09-07 ENCOUNTER — OFFICE VISIT (OUTPATIENT)
Dept: OBSTETRICS AND GYNECOLOGY | Facility: CLINIC | Age: 38
End: 2018-09-07

## 2018-09-07 VITALS
DIASTOLIC BLOOD PRESSURE: 84 MMHG | HEIGHT: 64 IN | WEIGHT: 194 LBS | BODY MASS INDEX: 33.12 KG/M2 | SYSTOLIC BLOOD PRESSURE: 130 MMHG

## 2018-09-07 DIAGNOSIS — Z01.419 ENCOUNTER FOR WELL WOMAN EXAM WITH ROUTINE GYNECOLOGICAL EXAM: Primary | ICD-10-CM

## 2018-09-07 DIAGNOSIS — N88.9 CERVICAL LESION: ICD-10-CM

## 2018-09-07 DIAGNOSIS — Z12.4 SCREENING FOR MALIGNANT NEOPLASM OF CERVIX: ICD-10-CM

## 2018-09-07 PROCEDURE — 99395 PREV VISIT EST AGE 18-39: CPT | Performed by: OBSTETRICS & GYNECOLOGY

## 2018-09-07 PROCEDURE — 57500 BIOPSY OF CERVIX: CPT | Performed by: OBSTETRICS & GYNECOLOGY

## 2018-09-07 NOTE — PROGRESS NOTES
Subjective  Chief Complaint   Patient presents with   • Gynecologic Exam     NO COMPLAINTS. PAP DONE OVER 3 YEARS AGO AND HAD ABLATION 2 YEARS AGO     Priscilla Brooke is a 37 y.o. year old  presenting to be seen for her annual exam.     OTHER COMPLAINTS:  Nothing else    She denies nausea, emesis, fevers, chills, back pain, mastalgia, myalgia, headaches, vision changes, dyspnea, chest pain.    MENSTRUAL Hx:  No LMP recorded.  She underwent an endometrial ablation in 2016.  She is now amenorrheic.   Current birth control method: tubal ligation.    Routine Health Maintenance  Last Pap Smear:  > 3 years ago, denies abnormals  Last MMG:  Never  Last Dexa:  Never  Last Colonoscopy:  Never    History  Past Medical History:   Diagnosis Date   • Gestational diabetes mellitus 2016     Current Outpatient Prescriptions on File Prior to Visit   Medication Sig Dispense Refill   • cyanocobalamin 1000 MCG/ML injection Inject 1 mL into the appropriate muscle as directed by prescriber Every 28 (Twenty-Eight) Days. 30 mL 1   • naproxen (NAPROSYN) 500 MG tablet Take 1 tablet by mouth 2 (Two) Times a Day With Meals. 60 tablet 2   • phentermine 37.5 MG capsule Take 1 capsule by mouth Every Morning for 30 days. 30 capsule 2   • traZODone (DESYREL) 50 MG tablet Take 1 tablet by mouth Every Night for 30 days. 30 tablet 5     No current facility-administered medications on file prior to visit.      Allergies   Allergen Reactions   • Latex      Past Surgical History:   Procedure Laterality Date   • TUBAL ABDOMINAL LIGATION       History reviewed. No pertinent family history.  Social History     Social History   • Marital status:      Social History Main Topics   • Smoking status: Former Smoker     Types: Cigarettes     Quit date: 2005   • Smokeless tobacco: Never Used   • Alcohol use No   • Drug use: No   • Sexual activity: Yes     Partners: Female     Other Topics Concern   • Not on file       Review of  "Systems  Pertinent items are noted in HPI, all other systems reviewed and negative    Objective  /84   Ht 162.6 cm (64\")   Wt 88 kg (194 lb)   BMI 33.30 kg/m²   Physical Exam:  General Appearance: alert, pleasant, appears stated age, interactive and cooperative  Head: normocephalic, without obvious abnormality and atraumatic  Eyes: lids and lashes normal and no icterus  Ears: ears appear intact with no abnormalities noted  Nose: nares normal, septum midline, mucosa normal and no drainage  Neck: suppple, trachea midline and no thyromegaly  Back: no kyphosis present, no scoliosis present and range of motion normal  Lungs: respirations regular, respirations even and respirations unlabored  Breasts: Examined in supine position  Symmetric without masses or skin dimpling  Nipples normal without inversion, lesions or discharge  There are no palpable axillary nodes  Abdomen: no masses, no hepatomegaly, no splenomegaly, soft non-tender, no guarding and no rebound tenderness  Extremities: moves extremities well, no edema, no cyanosis and no redness  Skin: no bleeding, bruising or rash and no lesions noted  Lymph Nodes: no palpable adenopathy  Neurologic: Cranial Nerves cranial nerves 2 - 12 grossly intact, Speech normal content and execusion, Coordination normal  Psych: normal mood and affect, oriented to person, time and place, thought content organized and appropriate judgment    Pelvis:  Pelvic: Clinical staff was present for exam  External genitalia:  normal appearance of the external genitalia including Bartholin's and [place]'s glands.  :  urethral meatus normal;  Vagina:  normal pink mucosa without prolapse or lesions.  Cervix:  Nabothian cyst(s) present at 6 o'clock; friable erythematous lesion present at 12 O'Clock  Uterus:  normal size, shape and consistency.  Adnexa:  normal bimanual exam of the adnexa.  Rectal:  digital rectal exam not performed; anus visually normal appearing.    Assessment/Plan "     Problem List Items Addressed This Visit     None      Visit Diagnoses     Screening for malignant neoplasm of cervix    -  Primary    Relevant Orders    Pap IG, HPV-hr    Cervical lesion        Relevant Orders    Tissue Pathology Exam        Cervical Biopsy Procedure:  The risks and benefits were explained.  She was consented.  The speculum was placed in the vagina to visualize the cervix.  Iodine was used to clean the cervix.  Kevorkian biopsy forceps were used to sample the tissue at 12:00.  Hemostasis was obtained with Monsel solution.  All instruments removed from the vagina.    Well Woman Exam:  - Self breast awareness encouraged  - Mammogram: N/A  - Pap screening guidelines reviewed; pap collected today with cervical biopsy as above  - Yearly clinical breast and pelvic exams recommended regardless of pap recommendations  - Dexa N/A  - Colonoscopy N/A  - Healthy diet and exercise encouraged  - Calcium and Vitamin D requirements reviewed  - Contraception: BTL  - Screening: None  - Seat belt use encouraged    Follow up 1 year for annual exam    Ruben Malagon MD  Obstetrics and Gynecology  [facility]

## 2018-09-13 DIAGNOSIS — Z12.4 SCREENING FOR MALIGNANT NEOPLASM OF CERVIX: ICD-10-CM

## 2018-09-13 DIAGNOSIS — N88.9 CERVICAL LESION: ICD-10-CM

## 2018-12-27 ENCOUNTER — OFFICE VISIT (OUTPATIENT)
Dept: FAMILY MEDICINE CLINIC | Facility: CLINIC | Age: 38
End: 2018-12-27

## 2018-12-27 ENCOUNTER — RESULTS ENCOUNTER (OUTPATIENT)
Dept: FAMILY MEDICINE CLINIC | Facility: CLINIC | Age: 38
End: 2018-12-27

## 2018-12-27 VITALS
SYSTOLIC BLOOD PRESSURE: 118 MMHG | HEART RATE: 87 BPM | OXYGEN SATURATION: 98 % | RESPIRATION RATE: 12 BRPM | HEIGHT: 64 IN | DIASTOLIC BLOOD PRESSURE: 78 MMHG | WEIGHT: 192 LBS | BODY MASS INDEX: 32.78 KG/M2

## 2018-12-27 DIAGNOSIS — E66.09 CLASS 1 OBESITY DUE TO EXCESS CALORIES WITHOUT SERIOUS COMORBIDITY WITH BODY MASS INDEX (BMI) OF 33.0 TO 33.9 IN ADULT: ICD-10-CM

## 2018-12-27 DIAGNOSIS — G47.00 INSOMNIA, UNSPECIFIED TYPE: ICD-10-CM

## 2018-12-27 DIAGNOSIS — E53.8 VITAMIN B 12 DEFICIENCY: ICD-10-CM

## 2018-12-27 DIAGNOSIS — F33.0 MILD EPISODE OF RECURRENT MAJOR DEPRESSIVE DISORDER (HCC): ICD-10-CM

## 2018-12-27 DIAGNOSIS — Z80.0 FAMILY HISTORY OF COLON CANCER IN FATHER: ICD-10-CM

## 2018-12-27 DIAGNOSIS — F43.10 PTSD (POST-TRAUMATIC STRESS DISORDER): ICD-10-CM

## 2018-12-27 PROCEDURE — 99214 OFFICE O/P EST MOD 30 MIN: CPT | Performed by: NURSE PRACTITIONER

## 2018-12-27 RX ORDER — CITALOPRAM 20 MG/1
TABLET ORAL
COMMUNITY
Start: 2018-11-29 | End: 2018-12-27

## 2018-12-27 RX ORDER — PHENTERMINE HYDROCHLORIDE 37.5 MG/1
37.5 CAPSULE ORAL EVERY MORNING
Qty: 30 CAPSULE | Refills: 2 | Status: SHIPPED | OUTPATIENT
Start: 2018-12-27 | End: 2019-05-03 | Stop reason: SDUPTHER

## 2018-12-27 RX ORDER — PHENTERMINE HYDROCHLORIDE 37.5 MG/1
CAPSULE ORAL
COMMUNITY
Start: 2018-11-29 | End: 2018-12-27 | Stop reason: SDUPTHER

## 2018-12-27 RX ORDER — CITALOPRAM 40 MG/1
40 TABLET ORAL DAILY
Qty: 30 TABLET | Refills: 5 | Status: SHIPPED | OUTPATIENT
Start: 2018-12-27 | End: 2019-01-26

## 2018-12-27 RX ORDER — TRAZODONE HYDROCHLORIDE 50 MG/1
50 TABLET ORAL NIGHTLY
Qty: 30 TABLET | Refills: 5 | Status: SHIPPED | OUTPATIENT
Start: 2018-12-27 | End: 2019-08-06 | Stop reason: SDUPTHER

## 2018-12-27 RX ORDER — TRAZODONE HYDROCHLORIDE 50 MG/1
50 TABLET ORAL NIGHTLY
Qty: 30 TABLET | Refills: 5 | Status: SHIPPED | OUTPATIENT
Start: 2018-12-27 | End: 2018-12-27 | Stop reason: SDUPTHER

## 2018-12-27 NOTE — PROGRESS NOTES
Subjective   Priscilla Brooke is a 38 y.o. female.     Patient is here today for follow up on her chronic conditions:     PTSD/Depression/ Insomnia   Her dad passed away in October, from a massive heart attack,  then her step mother passed away 3 weeks later, of the same thing. She has been having to deal with all of this and traveling back and forth to [place] to deal with the  and estate plans. Her depression is not doing well the past couple months. Trazodone 50 mg is helping her sleep.    Vitamin B 12 deficiency  She had been taking her B 12 injections monthly and it has helped her fatigue.     Obesity  The Adipex helps her, when able to take it right and eat right. She has not been able to the past couple months, with the death of her dad and step mother.          The following portions of the patient's history were reviewed and updated as appropriate: allergies, current medications, past family history, past medical history, past social history, past surgical history and problem list.    Review of Systems   Constitutional: Negative.    HENT: Negative.    Eyes: Negative.    Respiratory: Negative.    Cardiovascular: Negative.    Gastrointestinal: Negative.    Genitourinary: Negative.    Musculoskeletal: Negative.    Skin: Negative.    Neurological: Negative for dizziness, syncope, weakness, numbness and headaches.   Hematological: Negative for adenopathy.   Psychiatric/Behavioral: Positive for dysphoric mood and sleep disturbance. Negative for confusion and suicidal ideas. The patient is not nervous/anxious.      Vitals:    18 1728   BP: 118/78   Pulse: 87   Resp: 12   SpO2: 98%     Objective   Physical Exam   Constitutional: She is oriented to person, place, and time. She appears well-developed and well-nourished. No distress.   HENT:   Head: Normocephalic.   Right Ear: External ear normal.   Left Ear: External ear normal.   Nose: Nose normal.   Mouth/Throat: Oropharynx is clear and moist. No  oropharyngeal exudate.   Eyes: Conjunctivae are normal.   Neck: Normal range of motion. Neck supple. No thyromegaly present.   Cardiovascular: Normal rate, regular rhythm, normal heart sounds and intact distal pulses.   No murmur heard.  Pulmonary/Chest: Effort normal and breath sounds normal. No respiratory distress. She has no wheezes. She has no rales. She exhibits no tenderness.   Abdominal: Soft. Bowel sounds are normal. She exhibits no distension and no mass. There is no hepatosplenomegaly or splenomegaly. There is no tenderness. There is no rebound and no guarding. No hernia.   Musculoskeletal: Normal range of motion. She exhibits no edema or tenderness.   NROM all major joints   Neurological: She is alert and oriented to person, place, and time. Coordination and gait normal.   Skin: Skin is warm and dry. No rash noted.   Psychiatric: She has a normal mood and affect. Her behavior is normal. Judgment and thought content normal.   Nursing note and vitals reviewed.      Assessment/Plan   Priscilla was seen today for depression.    Diagnoses and all orders for this visit:    PTSD (post-traumatic stress disorder)  -     citalopram (CELEXA) 40 MG tablet; Take 1 tablet by mouth Daily for 30 days.  -     Comprehensive Metabolic Panel; Future    Mild episode of recurrent major depressive disorder (CMS/HCC)  -     Discontinue: traZODone (DESYREL) 50 MG tablet; Take 1 tablet by mouth Every Night for 30 days.  -     citalopram (CELEXA) 40 MG tablet; Take 1 tablet by mouth Daily for 30 days.  -     traZODone (DESYREL) 50 MG tablet; Take 1 tablet by mouth Every Night for 30 days.  -     Comprehensive Metabolic Panel; Future    Insomnia, unspecified type  -     Discontinue: traZODone (DESYREL) 50 MG tablet; Take 1 tablet by mouth Every Night for 30 days.  -     traZODone (DESYREL) 50 MG tablet; Take 1 tablet by mouth Every Night for 30 days.  -     Comprehensive Metabolic Panel; Future    Vitamin B 12 deficiency  -      Vitamin B12; Future    Class 1 obesity due to excess calories without serious comorbidity with body mass index (BMI) of 33.0 to 33.9 in adult  -     phentermine 37.5 MG capsule; Take 1 capsule by mouth Every Morning.    Family history of colon cancer in father  -     Cologuard - Stool, Per Rectum; Future      Celexa increased today, to 40 mg, for better control of her depression and PTSD. Continue Trazodone for insomnia.     Continue B 12 as directed.     Adipex refilled for treatment of obesity. Nutrition and activity goals reviewed including: mainly water to drink, limit white flour/processed sugar, high protein, high fiber carbs, good breakfast, working toward 150 mins cardio per week, resistance training 2x/week.    Cologuard ordered today for screening, given her family history of colon cancer in her paternal grandmother. Her last screening was 5 years ago, colonoscopy was normal.     Labs ordered for screening of her chronic condition. She will get [facility] at [facility].    Patient was encouraged to keep me informed of any acute changes, lack of improvement, or any new concerning symptoms. Patient voiced understanding of all instructions and denied further questions.    Patient to RTC in 3 months and prn.

## 2019-01-16 ENCOUNTER — LAB (OUTPATIENT)
Dept: LAB | Facility: HOSPITAL | Age: 39
End: 2019-01-16

## 2019-01-16 DIAGNOSIS — G47.00 INSOMNIA, UNSPECIFIED TYPE: ICD-10-CM

## 2019-01-16 DIAGNOSIS — E53.8 VITAMIN B 12 DEFICIENCY: ICD-10-CM

## 2019-01-16 DIAGNOSIS — F33.0 MILD EPISODE OF RECURRENT MAJOR DEPRESSIVE DISORDER (HCC): ICD-10-CM

## 2019-01-16 DIAGNOSIS — F43.10 PTSD (POST-TRAUMATIC STRESS DISORDER): ICD-10-CM

## 2019-01-16 LAB
ALBUMIN SERPL-MCNC: 4.3 G/DL (ref 3.5–5)
ALBUMIN/GLOB SERPL: 1.5 G/DL (ref 1–2)
ALP SERPL-CCNC: 68 U/L (ref 38–126)
ALT SERPL W P-5'-P-CCNC: 17 U/L (ref 13–69)
ANION GAP SERPL CALCULATED.3IONS-SCNC: 11.9 MMOL/L (ref 10–20)
AST SERPL-CCNC: 24 U/L (ref 15–46)
BILIRUB SERPL-MCNC: 0.5 MG/DL (ref 0.2–1.3)
BUN BLD-MCNC: 11 MG/DL (ref 7–20)
BUN/CREAT SERPL: 13.8 (ref 7.1–23.5)
CALCIUM SPEC-SCNC: 8.9 MG/DL (ref 8.4–10.2)
CHLORIDE SERPL-SCNC: 104 MMOL/L (ref 98–107)
CO2 SERPL-SCNC: 29 MMOL/L (ref 26–30)
CREAT BLD-MCNC: 0.8 MG/DL (ref 0.6–1.3)
GFR SERPL CREATININE-BSD FRML MDRD: 80 ML/MIN/1.73
GLOBULIN UR ELPH-MCNC: 2.8 GM/DL
GLUCOSE BLD-MCNC: 74 MG/DL (ref 74–98)
POTASSIUM BLD-SCNC: 3.9 MMOL/L (ref 3.5–5.1)
PROT SERPL-MCNC: 7.1 G/DL (ref 6.3–8.2)
SODIUM BLD-SCNC: 141 MMOL/L (ref 137–145)
VIT B12 BLD-MCNC: 483 PG/ML (ref 239–931)

## 2019-01-16 PROCEDURE — 82607 VITAMIN B-12: CPT

## 2019-01-16 PROCEDURE — 80053 COMPREHEN METABOLIC PANEL: CPT

## 2019-01-16 PROCEDURE — 36415 COLL VENOUS BLD VENIPUNCTURE: CPT

## 2019-05-03 DIAGNOSIS — E66.09 CLASS 1 OBESITY DUE TO EXCESS CALORIES WITHOUT SERIOUS COMORBIDITY WITH BODY MASS INDEX (BMI) OF 33.0 TO 33.9 IN ADULT: ICD-10-CM

## 2019-05-06 RX ORDER — PHENTERMINE HYDROCHLORIDE 37.5 MG/1
37.5 CAPSULE ORAL EVERY MORNING
Qty: 30 CAPSULE | Refills: 2 | Status: SHIPPED | OUTPATIENT
Start: 2019-05-06 | End: 2019-11-27 | Stop reason: SDUPTHER

## 2019-06-13 DIAGNOSIS — Z80.0 FAMILY HISTORY OF COLON CANCER REQUIRING SCREENING COLONOSCOPY: Primary | ICD-10-CM

## 2019-07-31 ENCOUNTER — PATIENT MESSAGE (OUTPATIENT)
Dept: GASTROENTEROLOGY | Facility: CLINIC | Age: 39
End: 2019-07-31

## 2019-08-05 ENCOUNTER — PATIENT MESSAGE (OUTPATIENT)
Dept: FAMILY MEDICINE CLINIC | Facility: CLINIC | Age: 39
End: 2019-08-05

## 2019-08-05 DIAGNOSIS — G47.00 INSOMNIA, UNSPECIFIED TYPE: ICD-10-CM

## 2019-08-05 DIAGNOSIS — F33.0 MILD EPISODE OF RECURRENT MAJOR DEPRESSIVE DISORDER (HCC): ICD-10-CM

## 2019-08-05 RX ORDER — CITALOPRAM 40 MG/1
40 TABLET ORAL DAILY
Qty: 90 TABLET | Refills: 0 | Status: SHIPPED | OUTPATIENT
Start: 2019-08-05 | End: 2019-08-06 | Stop reason: SDUPTHER

## 2019-08-05 RX ORDER — TRAZODONE HYDROCHLORIDE 50 MG/1
50 TABLET ORAL
Qty: 90 TABLET | Refills: 0 | Status: SHIPPED | OUTPATIENT
Start: 2019-08-05 | End: 2019-11-27 | Stop reason: SDUPTHER

## 2019-08-05 NOTE — TELEPHONE ENCOUNTER
From: Priscilla Brooke  To: Sera Guerrero APRN  Sent: 8/5/2019 3:15 PM EDT  Subject: Prescription Question    well that's how crazy I am with filling my own patients meds. I meant Celexa 40 mg     ----- Message -----  From: HIMANSHU BOLAÑOS  Sent: 8/5/19, 3:13 PM  To: Priscilla Brooke  Subject: RE: Prescription Question    Good Afternoon Priscilla,    I can send in the trazadone, but I do not see cymbalta on your chart.  Has sera ever given you this medication?    Thank you,  [place]    ----- Message -----   From: Priscilla Brooke   Sent: 8/5/2019 2:16 PM EDT   To: CRIS Ravi  Subject: Prescription Question    Sera,  I'm in need of refills for both my cymbalta, and trazadone. Is there any way you can do refills for 90 days? Thanks so much

## 2019-08-05 NOTE — TELEPHONE ENCOUNTER
From: Priscilla Brooke  To: Sera Guerrero APRN  Sent: 8/5/2019 2:16 PM EDT  Subject: Prescription Question    Sera,  I'm in need of refills for both my cymbalta, and trazadone. Is there any way you can do refills for 90 days? Thanks so much

## 2019-08-06 RX ORDER — CITALOPRAM 40 MG/1
40 TABLET ORAL DAILY
Qty: 90 TABLET | Refills: 0 | Status: SHIPPED | OUTPATIENT
Start: 2019-08-06 | End: 2019-11-27 | Stop reason: SDUPTHER

## 2019-08-06 RX ORDER — TRAZODONE HYDROCHLORIDE 50 MG/1
50 TABLET ORAL NIGHTLY
Qty: 90 TABLET | Refills: 0 | Status: SHIPPED | OUTPATIENT
Start: 2019-08-06 | End: 2019-11-27

## 2019-08-06 NOTE — TELEPHONE ENCOUNTER
From: Priscilla Brooke  To: Sera Guerrero APRN  Sent: 8/5/2019 3:20 PM EDT  Subject: Prescription Question    Thanks [place],  I will make a follow up     ----- Message -----  From: HIMANSHU BOLAÑOS  Sent: 8/5/19, 3:19 PM  To: Priscilla Brooke  Subject: RE: Prescription Question    That's alright.    I have sent both of your medications into the pharmacy for 90 day supplies.  I will let you know priscilla that you are due for a follow up visit, and it may be required before we can do any future refills.    Thank you,  [place]    ----- Message -----   From: Priscilla Brooke   Sent: 8/5/2019 3:15 PM EDT   To: CRIS Ravi  Subject: Prescription Question    well that's how crazy I am with filling my own patients meds. I meant Celexa 40 mg     ----- Message -----  From: HIMANSHU BOLAÑOS  Sent: 8/5/19, 3:13 PM  To: Priscilla Brooke  Subject: RE: Prescription Question    Good Afternoon Priscilla,    I can send in the trazadone, but I do not see cymbalta on your chart.  Has sera ever given you this medication?    Thank you,  [place]    ----- Message -----   From: Priscilla Brooke   Sent: 8/5/2019 2:16 PM EDT   To: CRIS Ravi  Subject: Prescription Question    Sera,  I'm in need of refills for both my cymbalta, and trazadone. Is there any way you can do refills for 90 days? Thanks so much

## 2019-08-20 DIAGNOSIS — E53.8 VITAMIN B 12 DEFICIENCY: ICD-10-CM

## 2019-08-20 RX ORDER — CYANOCOBALAMIN 1000 UG/ML
1000 INJECTION, SOLUTION INTRAMUSCULAR; SUBCUTANEOUS
Qty: 30 ML | Refills: 1 | Status: SHIPPED | OUTPATIENT
Start: 2019-08-20 | End: 2019-11-27 | Stop reason: SDUPTHER

## 2019-09-09 RX ORDER — PREDNISONE 10 MG/1
TABLET ORAL DAILY
Qty: 21 TABLET | Refills: 0 | Status: SHIPPED | OUTPATIENT
Start: 2019-09-09 | End: 2019-11-27

## 2019-11-27 ENCOUNTER — OFFICE VISIT (OUTPATIENT)
Dept: FAMILY MEDICINE CLINIC | Facility: CLINIC | Age: 39
End: 2019-11-27

## 2019-11-27 VITALS
DIASTOLIC BLOOD PRESSURE: 70 MMHG | HEART RATE: 78 BPM | WEIGHT: 204 LBS | SYSTOLIC BLOOD PRESSURE: 115 MMHG | HEIGHT: 64 IN | TEMPERATURE: 98.7 F | BODY MASS INDEX: 34.83 KG/M2 | OXYGEN SATURATION: 96 %

## 2019-11-27 DIAGNOSIS — F33.0 MILD EPISODE OF RECURRENT MAJOR DEPRESSIVE DISORDER (HCC): Primary | ICD-10-CM

## 2019-11-27 DIAGNOSIS — E53.8 VITAMIN B 12 DEFICIENCY: ICD-10-CM

## 2019-11-27 DIAGNOSIS — M54.32 SCIATICA OF LEFT SIDE: ICD-10-CM

## 2019-11-27 DIAGNOSIS — G47.00 INSOMNIA, UNSPECIFIED TYPE: ICD-10-CM

## 2019-11-27 DIAGNOSIS — F43.10 PTSD (POST-TRAUMATIC STRESS DISORDER): ICD-10-CM

## 2019-11-27 PROCEDURE — 99214 OFFICE O/P EST MOD 30 MIN: CPT | Performed by: NURSE PRACTITIONER

## 2019-11-27 PROCEDURE — 96372 THER/PROPH/DIAG INJ SC/IM: CPT | Performed by: NURSE PRACTITIONER

## 2019-11-27 RX ORDER — TRAZODONE HYDROCHLORIDE 50 MG/1
50 TABLET ORAL
Qty: 90 TABLET | Refills: 1 | Status: SHIPPED | OUTPATIENT
Start: 2019-11-27

## 2019-11-27 RX ORDER — KETOROLAC TROMETHAMINE 30 MG/ML
30 INJECTION, SOLUTION INTRAMUSCULAR; INTRAVENOUS ONCE
Status: COMPLETED | OUTPATIENT
Start: 2019-11-27 | End: 2019-11-27

## 2019-11-27 RX ORDER — CYANOCOBALAMIN 1000 UG/ML
1000 INJECTION, SOLUTION INTRAMUSCULAR; SUBCUTANEOUS
Qty: 3 ML | Refills: 3 | Status: SHIPPED | OUTPATIENT
Start: 2019-11-27

## 2019-11-27 RX ORDER — METHYLPREDNISOLONE 4 MG/1
TABLET ORAL
Qty: 21 TABLET | Refills: 0 | Status: SHIPPED | OUTPATIENT
Start: 2019-11-27 | End: 2020-06-04

## 2019-11-27 RX ORDER — PHENTERMINE HYDROCHLORIDE 37.5 MG/1
37.5 CAPSULE ORAL EVERY MORNING
Qty: 30 CAPSULE | Refills: 1 | Status: SHIPPED | OUTPATIENT
Start: 2019-11-27 | End: 2019-12-02 | Stop reason: SDUPTHER

## 2019-11-27 RX ORDER — DEXAMETHASONE SODIUM PHOSPHATE 4 MG/ML
4 INJECTION, SOLUTION INTRA-ARTICULAR; INTRALESIONAL; INTRAMUSCULAR; INTRAVENOUS; SOFT TISSUE ONCE
Status: COMPLETED | OUTPATIENT
Start: 2019-11-27 | End: 2019-11-27

## 2019-11-27 RX ORDER — NAPROXEN 500 MG/1
500 TABLET ORAL 2 TIMES DAILY WITH MEALS
Qty: 270 TABLET | Refills: 1 | Status: SHIPPED | OUTPATIENT
Start: 2019-11-27

## 2019-11-27 RX ORDER — CITALOPRAM 40 MG/1
40 TABLET ORAL DAILY
Qty: 90 TABLET | Refills: 1 | Status: SHIPPED | OUTPATIENT
Start: 2019-11-27

## 2019-11-27 RX ADMIN — KETOROLAC TROMETHAMINE 30 MG: 30 INJECTION, SOLUTION INTRAMUSCULAR; INTRAVENOUS at 18:02

## 2019-11-27 RX ADMIN — DEXAMETHASONE SODIUM PHOSPHATE 4 MG: 4 INJECTION, SOLUTION INTRA-ARTICULAR; INTRALESIONAL; INTRAMUSCULAR; INTRAVENOUS; SOFT TISSUE at 18:01

## 2019-12-02 RX ORDER — PHENTERMINE HYDROCHLORIDE 37.5 MG/1
37.5 CAPSULE ORAL EVERY MORNING
Qty: 30 CAPSULE | Refills: 1 | Status: SHIPPED | OUTPATIENT
Start: 2019-12-02 | End: 2020-12-10

## 2019-12-02 NOTE — TELEPHONE ENCOUNTER
Wendy from [facility] [place] [facility] [place] that the patient will be transferring all of her medications to them. They were able to get all of the patients scripts from [facility] pharmacy except The Phentermine.    They would like to know if this could be sent into the pharmacy for the patient.

## 2020-06-04 ENCOUNTER — OFFICE VISIT (OUTPATIENT)
Dept: UROLOGY | Facility: CLINIC | Age: 40
End: 2020-06-04

## 2020-06-04 VITALS
OXYGEN SATURATION: 95 % | HEART RATE: 122 BPM | TEMPERATURE: 97.8 F | BODY MASS INDEX: 34.83 KG/M2 | HEIGHT: 64 IN | RESPIRATION RATE: 18 BRPM | WEIGHT: 204 LBS

## 2020-06-04 DIAGNOSIS — N20.0 KIDNEY STONE: Primary | ICD-10-CM

## 2020-06-04 LAB
BILIRUB BLD-MCNC: NEGATIVE MG/DL
CLARITY, POC: CLEAR
COLOR UR: YELLOW
GLUCOSE UR STRIP-MCNC: NEGATIVE MG/DL
KETONES UR QL: NEGATIVE
LEUKOCYTE EST, POC: NEGATIVE
NITRITE UR-MCNC: NEGATIVE MG/ML
PH UR: 5.5 [PH] (ref 5–8)
PROT UR STRIP-MCNC: NEGATIVE MG/DL
RBC # UR STRIP: NEGATIVE /UL
SP GR UR: 1.03 (ref 1–1.03)
UROBILINOGEN UR QL: NORMAL

## 2020-06-04 PROCEDURE — 99243 OFF/OP CNSLTJ NEW/EST LOW 30: CPT | Performed by: UROLOGY

## 2020-06-04 PROCEDURE — 81003 URINALYSIS AUTO W/O SCOPE: CPT | Performed by: UROLOGY

## 2020-06-04 RX ORDER — BUSPIRONE HYDROCHLORIDE 10 MG/1
10 TABLET ORAL 2 TIMES DAILY PRN
COMMUNITY

## 2020-06-04 NOTE — PROGRESS NOTES
Chief Complaint  Kidney Stone    HPI  Ms. Brooke is a 39 y.o. female who presents for further management of nephrolithiasis.    She presented to ER and was diagnosed with at right sided kidney stone. She presents today for follow up.  She is not having any pain today.  No fever, chills, nausea, vomiting.  No dysuria.     Stone related history:  Family history of kidney stones  no  Renal disease or anatomic abnormality: no  Malabsorptive disease or gastric bypass: no  Frequent UTI's    no  Parathyroid disease    no    Dietary Considerations  Soda - 0 per day  Fast food - 3x per week  Water - 3-4 glasses per day  Sometimes Adds salt to foods    Past Medical History  Past Medical History:   Diagnosis Date   • Anemia    • Gestational diabetes mellitus 5/25/2016       Past Surgical History  Past Surgical History:   Procedure Laterality Date   • COLONOSCOPY  2012   • TUBAL ABDOMINAL LIGATION     • UPPER GASTROINTESTINAL ENDOSCOPY  2012       Medications    Current Outpatient Medications:   •  busPIRone (BUSPAR) 10 MG tablet, Take 10 mg by mouth 2 (Two) Times a Day As Needed., Disp: , Rfl:   •  citalopram (CeleXA) 40 MG tablet, Take 1 tablet by mouth Daily., Disp: 90 tablet, Rfl: 1  •  cyanocobalamin 1000 MCG/ML injection, Inject 1 mL into the appropriate muscle as directed by prescriber Every 28 (Twenty-Eight) Days., Disp: 3 mL, Rfl: 3  •  hydrocortisone (ANUSOL-HC) 2.5 % rectal cream, Insert  into the rectum 2 (Two) Times a Day., Disp: 30 g, Rfl: 1  •  naproxen (NAPROSYN) 500 MG tablet, Take 1 tablet by mouth 2 (Two) Times a Day With Meals., Disp: 270 tablet, Rfl: 1  •  phentermine 37.5 MG capsule, Take 1 capsule by mouth Every Morning., Disp: 30 capsule, Rfl: 1  •  traZODone (DESYREL) 50 MG tablet, Take 1 tablet by mouth every night at bedtime., Disp: 90 tablet, Rfl: 1    Allergies  Allergies   Allergen Reactions   • Latex        Social History  Social History     Socioeconomic History   • Marital status:       "Spouse name: Not on file   • Number of children: Not on file   • Years of education: Not on file   • Highest education level: Not on file   Tobacco Use   • Smoking status: Former Smoker     Types: Cigarettes     Last attempt to quit: 5/25/2005     Years since quitting: 15.0   • Smokeless tobacco: Never Used   Substance and Sexual Activity   • Alcohol use: No   • Drug use: No   • Sexual activity: Yes     Partners: Female       Family History  History reviewed. No pertinent family history.    Review of Systems  Constitutional: No fevers or chills  Skin: Negative for rash  Endocrine: No heat/cold intolerance   Cardiovascular: Negative for chest pain or dyspnea on exertion  Respiratory: Negative for shortness of breath or wheezing  Gastrointestinal: No constipation, nausea or vomiting  Genitourinary: No gross hematuria   Musculoskeletal: Negative for low back pain  Neurological:  Negative for frequent headaches or dizziness  Lymph/Heme: Negative for leg swelling or calf pain.    Physical Exam  Visit Vitals  Pulse (!) 122   Temp 97.8 °F (36.6 °C)   Resp 18   Ht 162.6 cm (64.02\")   Wt 92.5 kg (204 lb)   SpO2 95%   BMI 34.99 kg/m²     Constitutional: NAD, WDWN.   HEENT: NCAT. Conjunctivae normal.  MMM.  Endocrine: no clear thyromegaly    Cardiovascular: Regular rate.  Pulmonary/Chest: Respirations are even and non-labored bilaterally.  Back:  no CVA tenderness.  Neurological: A + O x 3.  Cranial Nerves II-XII grossly intact.  Extremities: JACOB x 4, Warm. No clubbing.  No cyanosis.    Skin: Pink, warm and dry.  No rashes noted.    Labs  Lab Results   Component Value Date    GLUCOSE 74 01/16/2019    BUN 11 01/16/2019    CREATININE 0.80 01/16/2019    EGFRIFNONA 80 01/16/2019    BCR 13.8 01/16/2019    K 3.9 01/16/2019    CO2 29.0 01/16/2019    CALCIUM 8.9 01/16/2019    ALBUMIN 4.30 01/16/2019    AST 24 01/16/2019    ALT 17 01/16/2019       Lab Results   Component Value Date    WBC 6.52 05/02/2018    HGB 13.1 05/02/2018    HCT " 39.9 05/02/2018    MCV 89.5 05/02/2018     05/02/2018       No results found for: LDH, URICACID    Lab Results   Component Value Date    CALCIUM 8.9 01/16/2019       Brief Urine Lab Results  (Last result in the past 365 days)      Color   Clarity   Blood   Leuk Est   Nitrite   Protein   CREAT   Urine HCG        06/04/20 1457 Yellow Clear Negative Negative Negative Negative               No results found for: URINECX    )No components found for: STONEANALYSI      Radiologic Studies       I have personally reviewed these labs and images.      Assessment  Ms. Brooke is a 39 y.o. female with possible 3mm right ureteral/renal stone per her report.  We do not have any images.  She is not having any pain today.      Plan  1. FU in 4 weeks with renal ultrasound prior, and she will bring in the disc from the ER at [facility]

## 2020-10-23 ENCOUNTER — TRANSCRIBE ORDERS (OUTPATIENT)
Dept: ADMINISTRATIVE | Facility: HOSPITAL | Age: 40
End: 2020-10-23

## 2020-10-23 DIAGNOSIS — Z12.31 SCREENING MAMMOGRAM, ENCOUNTER FOR: Primary | ICD-10-CM

## 2020-12-10 ENCOUNTER — OFFICE VISIT (OUTPATIENT)
Dept: OBSTETRICS AND GYNECOLOGY | Facility: CLINIC | Age: 40
End: 2020-12-10

## 2020-12-10 VITALS
WEIGHT: 210 LBS | BODY MASS INDEX: 35.85 KG/M2 | DIASTOLIC BLOOD PRESSURE: 80 MMHG | HEIGHT: 64 IN | SYSTOLIC BLOOD PRESSURE: 124 MMHG

## 2020-12-10 DIAGNOSIS — Z98.890 S/P ENDOMETRIAL ABLATION: ICD-10-CM

## 2020-12-10 DIAGNOSIS — Z98.51 S/P TUBAL LIGATION: ICD-10-CM

## 2020-12-10 DIAGNOSIS — N83.209 CYST OF OVARY, UNSPECIFIED LATERALITY: ICD-10-CM

## 2020-12-10 DIAGNOSIS — R10.2 PELVIC PAIN: Primary | ICD-10-CM

## 2020-12-10 PROCEDURE — 99213 OFFICE O/P EST LOW 20 MIN: CPT | Performed by: OBSTETRICS & GYNECOLOGY

## 2020-12-15 PROBLEM — Z98.890 S/P ENDOMETRIAL ABLATION: Status: ACTIVE | Noted: 2020-12-15

## 2020-12-15 PROBLEM — Z98.51 S/P TUBAL LIGATION: Status: ACTIVE | Noted: 2020-12-15

## 2020-12-15 NOTE — PROGRESS NOTES
Chief Complaint   Patient presents with   • Abdominal Pain     Lower abdominal pain, history of ovarian cyst. TVS done today       Priscilla Brooke is a 40 y.o. year old  presenting to be seen for pelvic pain and an ovarian cyst.    She reports waxing/waning lower abdominal pain.  She has a history of ovarian cysts.  Ultrasound done today to follow-up small right ovarian cyst identified on outside imaging.  No nausea and emesis.  No fevers and chills.  Previous endometrial ablation and tubal ligation.  Amenorrheic until several months ago when she started to have vaginal spotting.  Pain started roughly at that time as well.    Exam:  Normal abdominal exam    Pelvic ultrasound:  Normal sized, anteverted uterus with no masses.  The endometrium measures roughly 8 mm.  The right ovary has a roughly 2 cm cystic lesion that likely reflects a hemorrhagic cyst.  There is also a 2 cm follicle on this side.  Normal vascularity for the right ovary.  The left ovary has multiple follicles normal vascularity.  Small moderate free fluid in the cul-de-sac.    Assessment/Plan:  Pelvic pain  Right ovarian cyst, likely hemorrhagic  Previous endometrial ablation and tubal ligation    We reviewed her symptoms and ultrasound findings in detail today.  While it is possible that her pain is related to a hemorrhagic cyst, it may also be that she is developing post-ablation syndrome.  We will continue expectant management for now to see if her pain improves gradually over the coming weeks.  She will notify our clinic if she has ongoing/worsening pain and bleeding symptoms.  We will plan to repeat an ultrasound at her return visit in 6 weeks.  If post-ablation syndrome is identified, we discussed that hysterectomy is the most definitive management.  All questions answered.    Greater than 50% of this 15 minute visit was spent in face-to-face counseling and/or coordination of care for this patient.    Ruben Malagon,  MD  Obstetrics and Gynecology  [facility]

## 2020-12-21 ENCOUNTER — HOSPITAL ENCOUNTER (OUTPATIENT)
Dept: MAMMOGRAPHY | Facility: HOSPITAL | Age: 40
Discharge: HOME | End: 2020-12-21
Admitting: NURSE PRACTITIONER

## 2020-12-21 DIAGNOSIS — Z12.31 SCREENING MAMMOGRAM, ENCOUNTER FOR: ICD-10-CM

## 2020-12-21 PROCEDURE — 77067 SCR MAMMO BI INCL CAD: CPT

## 2020-12-21 PROCEDURE — 77063 BREAST TOMOSYNTHESIS BI: CPT

## 2021-01-04 ENCOUNTER — IMMUNIZATION (OUTPATIENT)
Dept: VACCINE CLINIC | Facility: HOSPITAL | Age: 41
End: 2021-01-04

## 2021-01-04 PROCEDURE — 91301 HC SARSCO02 VAC 100MCG/0.5ML IM: CPT | Performed by: INTERNAL MEDICINE

## 2021-01-04 PROCEDURE — 0011A: CPT | Performed by: INTERNAL MEDICINE

## 2021-02-01 ENCOUNTER — IMMUNIZATION (OUTPATIENT)
Dept: VACCINE CLINIC | Facility: HOSPITAL | Age: 41
End: 2021-02-01

## 2021-02-01 PROCEDURE — 0012A: CPT | Performed by: INTERNAL MEDICINE

## 2021-02-01 PROCEDURE — 91301 HC SARSCO02 VAC 100MCG/0.5ML IM: CPT | Performed by: INTERNAL MEDICINE

## 2021-10-06 ENCOUNTER — OFFICE VISIT (OUTPATIENT)
Dept: OBSTETRICS AND GYNECOLOGY | Facility: CLINIC | Age: 41
End: 2021-10-06

## 2021-10-06 VITALS
SYSTOLIC BLOOD PRESSURE: 112 MMHG | HEIGHT: 64 IN | BODY MASS INDEX: 35 KG/M2 | DIASTOLIC BLOOD PRESSURE: 74 MMHG | WEIGHT: 205 LBS

## 2021-10-06 DIAGNOSIS — Z98.890 S/P ENDOMETRIAL ABLATION: ICD-10-CM

## 2021-10-06 DIAGNOSIS — R10.2 PELVIC PAIN: Primary | ICD-10-CM

## 2021-10-06 DIAGNOSIS — Z98.51 S/P TUBAL LIGATION: ICD-10-CM

## 2021-10-06 PROCEDURE — 99213 OFFICE O/P EST LOW 20 MIN: CPT | Performed by: OBSTETRICS & GYNECOLOGY

## 2021-10-06 RX ORDER — SPIRONOLACTONE 25 MG/1
TABLET ORAL
COMMUNITY
Start: 2021-10-04

## 2021-10-08 LAB
A VAGINAE DNA VAG QL NAA+PROBE: NORMAL SCORE
BVAB2 DNA VAG QL NAA+PROBE: NORMAL SCORE
C ALBICANS DNA VAG QL NAA+PROBE: NEGATIVE
C GLABRATA DNA VAG QL NAA+PROBE: NEGATIVE
C TRACH DNA VAG QL NAA+PROBE: NEGATIVE
MEGA1 DNA VAG QL NAA+PROBE: NORMAL SCORE
N GONORRHOEA DNA VAG QL NAA+PROBE: NEGATIVE
T VAGINALIS DNA VAG QL NAA+PROBE: NEGATIVE

## 2021-10-09 NOTE — PROGRESS NOTES
GYN Office Visit    Subjective   Chief Complaint   Patient presents with   • Follow-up     TVS done today for ovarian cyst.     Priscilla Brooke is a 41 y.o. year old  presenting to be seen for pelvic pain.    She reports waxing/waning pelvic pain.  Pain is moderate in severity.  Pain iN bilateral lower quadrants and low back.  Previous endometrial ablation and tubal ligation.  She does note that pain precedes vaginal spotting.  No significant dyspareunia.    Past Medical History:   Diagnosis Date   • Anemia    • Gestational diabetes mellitus 2016       Past Surgical History:   Procedure Laterality Date   • COLONOSCOPY     • TUBAL ABDOMINAL LIGATION     • UPPER GASTROINTESTINAL ENDOSCOPY         No family history on file.     Social History     Tobacco Use   • Smoking status: Former Smoker     Types: Cigarettes     Quit date: 2005     Years since quittin.3   • Smokeless tobacco: Never Used   Substance Use Topics   • Alcohol use: No   • Drug use: No       (Not in a hospital admission)      Latex    Current Outpatient Medications on File Prior to Visit   Medication Sig Dispense Refill   • busPIRone (BUSPAR) 10 MG tablet Take 10 mg by mouth 2 (Two) Times a Day As Needed.     • citalopram (CeleXA) 40 MG tablet Take 1 tablet by mouth Daily. 90 tablet 1   • cyanocobalamin 1000 MCG/ML injection Inject 1 mL into the appropriate muscle as directed by prescriber Every 28 (Twenty-Eight) Days. 3 mL 3   • hydrocortisone (ANUSOL-HC) 2.5 % rectal cream Insert  into the rectum 2 (Two) Times a Day. 30 g 1   • naproxen (NAPROSYN) 500 MG tablet Take 1 tablet by mouth 2 (Two) Times a Day With Meals. 270 tablet 1   • spironolactone (ALDACTONE) 25 MG tablet      • traZODone (DESYREL) 50 MG tablet Take 1 tablet by mouth every night at bedtime. 90 tablet 1     No current facility-administered medications on file prior to visit.       Social History    Tobacco Use      Smoking status: Former Smoker         "Types: Cigarettes        Quit date: 2005        Years since quittin.3      Smokeless tobacco: Never Used         Objective   /74   Ht 162.6 cm (64\")   Wt 93 kg (205 lb)   BMI 35.19 kg/m²     Physical Exam:  General Appearance: alert, pleasant, appears stated age, interactive and cooperative  Breasts: Not performed.  Abdomen: no masses, no hepatomegaly, no splenomegaly, soft non-tender, no guarding and no rebound tenderness  Pelvis:  Pelvic: Clinical staff was present for exam  External genitalia:  normal appearance of the external genitalia including Bartholin's and [place]'s glands.  :  urethral meatus normal;  Vagina:  normal pink mucosa without prolapse or lesions.  Cervix:  normal appearance.  Uterus:  normal size, shape and consistency. tenderness to palpation is present;  Adnexa:  normal bimanual exam of the adnexa.  Rectal:  digital rectal exam not performed; anus visually normal appearing.         Medical Decision Making:    I reviewed her pelvic ultrasound today.    Assessment   Pelvic pain  Previous endometrial ablation  Previous tubal ligation  Uterine tenderness on exam     Plan    Orders Placed This Encounter   Procedures   • NuSwab VG+ - Swab, Cervix     Order Specific Question:   Release to patient     Answer:   Immediate     Order Specific Question:   [facility] Has the patient fasted?     Answer:   No       Medication Management: None    Procedures Performed: None    We reviewed her symptoms, exam findings and imaging in detail today.  The uterus does seem to be the culprit for her pain symptoms given her tenderness on exam today.  We discussed the concept of post ablation syndrome.  We discussed hysterectomy as definitive surgical management.  Continue expectant management for now and return to clinic in 3-6 months for reassessment of symptoms and ongoing discussion of treatment options.  Vaginal cultures obtained today to rule out infectious etiology for pain symptoms.  All " questions answered.    Ruben Malagon MD  Obstetrics and Gynecology  [facility]

## 2022-02-14 ENCOUNTER — HOSPITAL ENCOUNTER (OUTPATIENT)
Dept: GENERAL RADIOLOGY | Facility: HOSPITAL | Age: 42
Discharge: HOME | End: 2022-02-14
Admitting: NURSE PRACTITIONER

## 2022-02-14 ENCOUNTER — TRANSCRIBE ORDERS (OUTPATIENT)
Dept: GENERAL RADIOLOGY | Facility: HOSPITAL | Age: 42
End: 2022-02-14

## 2022-02-14 DIAGNOSIS — U09.9 POST-COVID SYNDROME: ICD-10-CM

## 2022-02-14 DIAGNOSIS — R05.9 COUGH: ICD-10-CM

## 2022-02-14 DIAGNOSIS — R05.9 COUGH: Primary | ICD-10-CM

## 2022-02-14 PROCEDURE — 71046 X-RAY EXAM CHEST 2 VIEWS: CPT

## 2022-05-23 ENCOUNTER — TRANSCRIBE ORDERS (OUTPATIENT)
Dept: GENERAL RADIOLOGY | Facility: HOSPITAL | Age: 42
End: 2022-05-23

## 2022-05-23 ENCOUNTER — HOSPITAL ENCOUNTER (OUTPATIENT)
Dept: GENERAL RADIOLOGY | Facility: HOSPITAL | Age: 42
Discharge: HOME | End: 2022-05-23
Admitting: NURSE PRACTITIONER

## 2022-05-23 DIAGNOSIS — M54.50 LOW BACK PAIN, UNSPECIFIED BACK PAIN LATERALITY, UNSPECIFIED CHRONICITY, UNSPECIFIED WHETHER SCIATICA PRESENT: Primary | ICD-10-CM

## 2022-05-23 DIAGNOSIS — M54.50 LOW BACK PAIN, UNSPECIFIED BACK PAIN LATERALITY, UNSPECIFIED CHRONICITY, UNSPECIFIED WHETHER SCIATICA PRESENT: ICD-10-CM

## 2022-05-23 PROCEDURE — 72110 X-RAY EXAM L-2 SPINE 4/>VWS: CPT

## 2023-03-13 ENCOUNTER — TRANSCRIBE ORDERS (OUTPATIENT)
Dept: ADMINISTRATIVE | Facility: HOSPITAL | Age: 43
End: 2023-03-13
Payer: COMMERCIAL

## 2023-03-13 DIAGNOSIS — Z12.31 VISIT FOR SCREENING MAMMOGRAM: Primary | ICD-10-CM

## 2023-06-16 ENCOUNTER — HOSPITAL ENCOUNTER (OUTPATIENT)
Dept: MAMMOGRAPHY | Facility: HOSPITAL | Age: 43
Discharge: HOME | End: 2023-06-16
Admitting: NURSE PRACTITIONER
Payer: COMMERCIAL

## 2023-06-16 DIAGNOSIS — Z12.31 VISIT FOR SCREENING MAMMOGRAM: ICD-10-CM

## 2023-06-16 PROCEDURE — 77067 SCR MAMMO BI INCL CAD: CPT

## 2023-06-16 PROCEDURE — 77063 BREAST TOMOSYNTHESIS BI: CPT

## 2023-08-18 ENCOUNTER — OFFICE VISIT (OUTPATIENT)
Dept: OBSTETRICS AND GYNECOLOGY | Facility: CLINIC | Age: 43
End: 2023-08-18
Payer: COMMERCIAL

## 2023-08-18 VITALS
BODY MASS INDEX: 36.06 KG/M2 | DIASTOLIC BLOOD PRESSURE: 80 MMHG | SYSTOLIC BLOOD PRESSURE: 122 MMHG | WEIGHT: 211.2 LBS | HEIGHT: 64 IN

## 2023-08-18 DIAGNOSIS — N90.7 VULVAR CYST: Primary | ICD-10-CM

## 2023-08-18 PROCEDURE — 99212 OFFICE O/P EST SF 10 MIN: CPT | Performed by: PHYSICIAN ASSISTANT

## 2023-08-18 NOTE — PATIENT INSTRUCTIONS
Recommend moist compresses to area 2-3 times daily  Avoid tight clothing  No antibiotic indicated at this time  RTO for pap/annual

## 2023-08-18 NOTE — PROGRESS NOTES
Subjective   Chief Complaint   Patient presents with    Follow-up     C/O painful vaginal cyst       Priscilla Brooke is a 42 y.o. year old  presenting to be seen for complaint of painful left vulvar cyst that has come and gone for several months.  Recently the cyst was marble sized and felt very hard.  She had drainage from the cyst recently and is much smaller now.      Past Medical History:   Diagnosis Date    Abnormal Pap smear of cervix     Anemia     Depression     Gestational diabetes mellitus 2016    History of transfusion     HPV (human papilloma virus) infection     Kidney stone     Multiple gestation     Ovarian cyst         Current Outpatient Medications:     busPIRone (BUSPAR) 10 MG tablet, Take 1 tablet by mouth 2 (Two) Times a Day As Needed., Disp: , Rfl:     citalopram (CeleXA) 40 MG tablet, Take 1 tablet by mouth Daily., Disp: 90 tablet, Rfl: 1    cyanocobalamin 1000 MCG/ML injection, Inject 1 mL into the appropriate muscle as directed by prescriber Every 28 (Twenty-Eight) Days., Disp: 3 mL, Rfl: 3    hydrocortisone (ANUSOL-HC) 2.5 % rectal cream, Insert  into the rectum 2 (Two) Times a Day., Disp: 30 g, Rfl: 1    naproxen (NAPROSYN) 500 MG tablet, Take 1 tablet by mouth 2 (Two) Times a Day With Meals., Disp: 270 tablet, Rfl: 1    spironolactone (ALDACTONE) 25 MG tablet, , Disp: , Rfl:     traZODone (DESYREL) 50 MG tablet, Take 1 tablet by mouth every night at bedtime., Disp: 90 tablet, Rfl: 1   Allergies   Allergen Reactions    Latex     Adhesive Tape Rash      Past Surgical History:   Procedure Laterality Date     SECTION WITH TUBAL  7/15/10    COLONOSCOPY  2012    ENDOMETRIAL ABLATION      TUBAL ABDOMINAL LIGATION      UPPER GASTROINTESTINAL ENDOSCOPY  2012      Social History     Socioeconomic History    Marital status:    Tobacco Use    Smoking status: Former     Types: Cigarettes     Quit date: 2005     Years since quittin.2    Smokeless tobacco:  "Never   Vaping Use    Vaping Use: Never used   Substance and Sexual Activity    Alcohol use: Yes     Alcohol/week: 1.0 - 2.0 standard drink     Types: 1 - 2 Glasses of wine per week     Comment: Weekly    Drug use: No    Sexual activity: Yes     Partners: Female     Birth control/protection: Tubal ligation      Family History   Problem Relation Age of Onset    Coronary artery disease Father        Review of Systems   Constitutional:  Negative for chills, diaphoresis and fever.   Gastrointestinal:  Negative for constipation, diarrhea, nausea and vomiting.   Genitourinary:  Positive for vaginal pain. Negative for difficulty urinating, dysuria, menstrual problem, pelvic pain and vaginal bleeding.         Objective   /80   Ht 162.6 cm (64\")   Wt 95.8 kg (211 lb 3.2 oz)   BMI 36.25 kg/mý     Physical Exam  Constitutional:       Appearance: Normal appearance. She is well-developed and well-groomed.   Eyes:      General: Lids are normal.      Extraocular Movements: Extraocular movements intact.      Conjunctiva/sclera: Conjunctivae normal.   Genitourinary:     Labia:         Right: No rash, tenderness, lesion or injury.         Left: Tenderness and lesion present. No rash or injury.       Urethra: No prolapse, urethral pain, urethral swelling or urethral lesion.          Comments: Small 4-5 mm flesh colored bump with pinpoint opening left lower labia minora. Small amount thin white material expressed with squeezing bump. No redness or signs of inflammation   Neurological:      Mental Status: She is alert.   Psychiatric:         Attention and Perception: Attention normal.         Mood and Affect: Mood normal.         Speech: Speech normal.         Behavior: Behavior is cooperative.          Result Review :                   Assessment and Plan  Diagnoses and all orders for this visit:    1. Vulvar cyst (Primary)      Patient Instructions   Recommend moist compresses to area 2-3 times daily  Avoid tight clothing  No " antibiotic indicated at this time  RTO for pap/annual           This note was electronically signed.    Carla Moore PA-C   August 18, 2023

## 2024-02-16 ENCOUNTER — OFFICE VISIT (OUTPATIENT)
Dept: OBSTETRICS AND GYNECOLOGY | Facility: CLINIC | Age: 44
End: 2024-02-16
Payer: COMMERCIAL

## 2024-02-16 VITALS
WEIGHT: 220 LBS | SYSTOLIC BLOOD PRESSURE: 116 MMHG | DIASTOLIC BLOOD PRESSURE: 78 MMHG | BODY MASS INDEX: 37.56 KG/M2 | HEIGHT: 64 IN

## 2024-02-16 DIAGNOSIS — Z01.419 ENCOUNTER FOR GYNECOLOGICAL EXAMINATION WITHOUT ABNORMAL FINDING: Primary | ICD-10-CM

## 2024-02-16 DIAGNOSIS — Z12.31 ENCOUNTER FOR SCREENING MAMMOGRAM FOR BREAST CANCER: ICD-10-CM

## 2024-02-16 DIAGNOSIS — R10.2 PELVIC PAIN: ICD-10-CM

## 2024-02-16 DIAGNOSIS — Z12.4 SCREENING FOR MALIGNANT NEOPLASM OF CERVIX: ICD-10-CM

## 2024-02-21 ENCOUNTER — TELEPHONE (OUTPATIENT)
Dept: OBSTETRICS AND GYNECOLOGY | Facility: CLINIC | Age: 44
End: 2024-02-21

## 2024-02-21 ENCOUNTER — OFFICE VISIT (OUTPATIENT)
Dept: OBSTETRICS AND GYNECOLOGY | Facility: CLINIC | Age: 44
End: 2024-02-21
Payer: COMMERCIAL

## 2024-02-21 ENCOUNTER — PREP FOR SURGERY (OUTPATIENT)
Dept: OTHER | Facility: HOSPITAL | Age: 44
End: 2024-02-21
Payer: COMMERCIAL

## 2024-02-21 VITALS
DIASTOLIC BLOOD PRESSURE: 76 MMHG | HEIGHT: 64 IN | BODY MASS INDEX: 36.88 KG/M2 | WEIGHT: 216 LBS | SYSTOLIC BLOOD PRESSURE: 118 MMHG

## 2024-02-21 DIAGNOSIS — Z98.51 S/P TUBAL LIGATION: ICD-10-CM

## 2024-02-21 DIAGNOSIS — R10.2 CHRONIC PELVIC PAIN IN FEMALE: Primary | ICD-10-CM

## 2024-02-21 DIAGNOSIS — Z98.890 HISTORY OF ENDOMETRIAL ABLATION: ICD-10-CM

## 2024-02-21 DIAGNOSIS — G89.29 CHRONIC PELVIC PAIN IN FEMALE: Primary | ICD-10-CM

## 2024-02-21 DIAGNOSIS — Z98.890 S/P ENDOMETRIAL ABLATION: ICD-10-CM

## 2024-02-21 DIAGNOSIS — Z98.51 H/O TUBAL LIGATION: ICD-10-CM

## 2024-02-21 DIAGNOSIS — N39.46 URINARY INCONTINENCE, MIXED: ICD-10-CM

## 2024-02-21 LAB — REF LAB TEST METHOD: NORMAL

## 2024-02-21 PROCEDURE — 99214 OFFICE O/P EST MOD 30 MIN: CPT | Performed by: OBSTETRICS & GYNECOLOGY

## 2024-02-21 RX ORDER — PHENAZOPYRIDINE HYDROCHLORIDE 100 MG/1
200 TABLET, FILM COATED ORAL ONCE
OUTPATIENT
Start: 2024-02-21 | End: 2024-02-21

## 2024-02-21 RX ORDER — SODIUM CHLORIDE 0.9 % (FLUSH) 0.9 %
10 SYRINGE (ML) INJECTION EVERY 12 HOURS SCHEDULED
OUTPATIENT
Start: 2024-02-21

## 2024-02-21 RX ORDER — SODIUM CHLORIDE 9 MG/ML
40 INJECTION, SOLUTION INTRAVENOUS AS NEEDED
OUTPATIENT
Start: 2024-02-21

## 2024-02-21 RX ORDER — CEFAZOLIN SODIUM 2 G/50ML
2000 SOLUTION INTRAVENOUS ONCE
OUTPATIENT
Start: 2024-02-21 | End: 2024-02-21

## 2024-02-21 RX ORDER — SODIUM CHLORIDE 0.9 % (FLUSH) 0.9 %
10 SYRINGE (ML) INJECTION AS NEEDED
OUTPATIENT
Start: 2024-02-21

## 2024-02-21 NOTE — TELEPHONE ENCOUNTER
----- Message from Priscilla Brooke sent at 2/21/2024  2:52 PM EST -----  Regarding: Urologist appt  Contact: [phone]  I went to Dr Sullivan’s office today and since it’s been 3 yrs since I’ve seen anyone in the office, I will need a referral for a f/u

## 2024-03-04 PROBLEM — N39.46 URINARY INCONTINENCE, MIXED: Status: ACTIVE | Noted: 2024-03-04

## 2024-03-04 NOTE — PROGRESS NOTES
GYN Office Visit    Subjective   Chief Complaint   Patient presents with    Pelvic Pain     TVS done today     Priscilla Brooke is a 43 y.o. year old  presenting for follow-up pelvic pain.    She reports waxing/waning pelvic pain.  Pain is severe.  Pain in bilateral lower quadrants and low back.  Previous endometrial ablation and tubal ligation.  She does note that pain precedes vaginal spotting.  No significant dyspareunia.    She has worsening mixed urinary incontinence.  She does leak urine every day.    Her pain and urinary symptoms are debilitating and affect her ability to participate in activities of daily life.    OB history:  x 3,  x 1  Pap smear: NILM, HPV positive, 16/18 negative ()    Past Medical History:   Diagnosis Date    Abnormal Pap smear of cervix     Anemia     Depression     Gestational diabetes mellitus 2016    History of transfusion     HPV (human papilloma virus) infection     Hyperlipidemia     Kidney stone     Multiple gestation     Ovarian cyst        Past Surgical History:   Procedure Laterality Date     SECTION WITH TUBAL  7/15/10    COLONOSCOPY  2012    ENDOMETRIAL ABLATION      TUBAL ABDOMINAL LIGATION      UPPER GASTROINTESTINAL ENDOSCOPY  2012       Family History   Problem Relation Age of Onset    Coronary artery disease Father     Hypertension Father     Colon cancer Paternal Grandmother     Stroke Paternal Grandmother         Social History     Tobacco Use    Smoking status: Former     Current packs/day: 0.00     Types: Cigarettes     Quit date: 2005     Years since quittin.7    Smokeless tobacco: Never   Vaping Use    Vaping status: Never Used   Substance Use Topics    Alcohol use: Yes     Alcohol/week: 1.0 - 2.0 standard drink of alcohol     Types: 1 - 2 Glasses of wine per week     Comment: Occasionally    Drug use: No       (Not in a hospital admission)      Latex and Adhesive tape    Current Outpatient Medications on  "File Prior to Visit   Medication Sig Dispense Refill    busPIRone (BUSPAR) 10 MG tablet Take 1 tablet by mouth 2 (Two) Times a Day As Needed.      citalopram (CeleXA) 40 MG tablet Take 1 tablet by mouth Daily. 90 tablet 1    cyanocobalamin 1000 MCG/ML injection Inject 1 mL into the appropriate muscle as directed by prescriber Every 28 (Twenty-Eight) Days. 3 mL 3    hydrocortisone (ANUSOL-HC) 2.5 % rectal cream Insert  into the rectum 2 (Two) Times a Day. 30 g 1    naproxen (NAPROSYN) 500 MG tablet Take 1 tablet by mouth 2 (Two) Times a Day With Meals. 270 tablet 1    spironolactone (ALDACTONE) 25 MG tablet       traZODone (DESYREL) 50 MG tablet Take 1 tablet by mouth every night at bedtime. 90 tablet 1     No current facility-administered medications on file prior to visit.       Social History    Tobacco Use      Smoking status: Former        Packs/day: 0.00        Types: Cigarettes        Quit date: 2005        Years since quittin.7      Smokeless tobacco: Never         Objective   /76   Ht 162.6 cm (64\")   Wt 98 kg (216 lb)   BMI 37.08 kg/m²     Physical Exam:  General Appearance: alert, pleasant, appears stated age, interactive and cooperative  Breasts: Not performed.  Abdomen: no masses, no hepatomegaly, no splenomegaly, soft non-tender, no guarding and no rebound tenderness  Pelvis:  Pelvic: Clinical staff was present for exam  External genitalia:  normal appearance of the external genitalia including Bartholin's and [place]'s glands.  :  urethral meatus normal; positive supine cough stress test with an empty bladder  Vagina:  normal pink mucosa without prolapse or lesions.  Cervix:  normal appearance.  Uterus:  normal size, shape and consistency. tenderness to palpation is present;  Adnexa:  normal bimanual exam of the adnexa.  Rectal:  digital rectal exam not performed; anus visually normal appearing.         Medical Decision Making:    I reviewed her pelvic ultrasound from " today.    U/S:  Normal-sized, anteverted uterus with no masses. Uterine length measures 7.3 cm. The endometrium is difficult to measure given her previous ablation, but several pockets of fluid are noted. Both ovaries are normal in appearance. No free fluid in the cul-de-sac.     Assessment   Post-ablation Tubal Sterilization Syndrome (PATSS)  Severe pelvic pain  Previous endometrial ablation  Previous tubal ligation  Uterine tenderness on exam  Mixed urinary incontinence  HPV+     Plan    Orders Placed This Encounter   Procedures    NuSwab VG+ - Swab, Cervix     Order Specific Question:   Release to patient     Answer:   Routine Release [[id number]]    US Non-ob Transvaginal     Order Specific Question:   Reason for Exam:     Answer:   pelvic pain     Order Specific Question:   Release to patient     Answer:   Routine Release [[id number]]    Ambulatory Referral to Urology     Referral Priority:   Routine     Referral Type:   Consultation     Referral Reason:   Specialty Services Required     Referred to Provider:   Bob Sullivan MD     Requested Specialty:   Urology     Number of Visits Requested:   1       Medication Management: None    Procedures Performed: None    We reviewed her symptoms, exam findings and imaging in detail today.  We discussed PATSS today.  We discussed surgical treatment options.  She would like to move forward with definitive surgical management. Plan for total laparoscopic hysterectomy, bilateral salpingectomy and cystoscopy. Risks for the procedure were reviewed today. Given her significant urinary symptoms, she will meet with urology preoperatively.  A combination case can be scheduled from there.  Vaginal cultures were collected today.  All questions answered.    Ruben Malagon MD  Obstetrics and Gynecology  [facility]

## 2024-03-25 ENCOUNTER — OFFICE VISIT (OUTPATIENT)
Dept: UROLOGY | Facility: CLINIC | Age: 44
End: 2024-03-25
Payer: COMMERCIAL

## 2024-03-25 VITALS
SYSTOLIC BLOOD PRESSURE: 118 MMHG | DIASTOLIC BLOOD PRESSURE: 84 MMHG | BODY MASS INDEX: 36.88 KG/M2 | OXYGEN SATURATION: 100 % | HEART RATE: 58 BPM | WEIGHT: 216 LBS | HEIGHT: 64 IN

## 2024-03-25 DIAGNOSIS — N39.46 MIXED INCONTINENCE: Primary | ICD-10-CM

## 2024-03-25 PROBLEM — Z98.890 HISTORY OF ENDOMETRIAL ABLATION: Status: ACTIVE | Noted: 2024-02-21

## 2024-03-25 PROBLEM — Z98.51 H/O TUBAL LIGATION: Status: ACTIVE | Noted: 2024-02-21

## 2024-03-25 LAB
BILIRUB BLD-MCNC: NEGATIVE MG/DL
CLARITY, POC: CLEAR
COLOR UR: YELLOW
EXPIRATION DATE: ABNORMAL
GLUCOSE UR STRIP-MCNC: NEGATIVE MG/DL
KETONES UR QL: ABNORMAL
LEUKOCYTE EST, POC: NEGATIVE
Lab: ABNORMAL
NITRITE UR-MCNC: NEGATIVE MG/ML
PH UR: 5 [PH] (ref 5–8)
PROT UR STRIP-MCNC: ABNORMAL MG/DL
RBC # UR STRIP: ABNORMAL /UL
SP GR UR: 1.03 (ref 1–1.03)
UROBILINOGEN UR QL: NORMAL

## 2024-03-25 PROCEDURE — 81003 URINALYSIS AUTO W/O SCOPE: CPT | Performed by: UROLOGY

## 2024-03-25 PROCEDURE — 99214 OFFICE O/P EST MOD 30 MIN: CPT | Performed by: UROLOGY

## 2024-03-25 PROCEDURE — 51798 US URINE CAPACITY MEASURE: CPT | Performed by: UROLOGY

## 2024-03-25 NOTE — PROGRESS NOTES
Chief Complaint  Chief Complaint   Patient presents with    Urinary Incontinence     New patient      Referring Provider  Ruben Malagon MD    HPI  Ms. Brooke is a 43 y.o. female presents with complaint of urinary incontinence for 2 years.     Pt has primarily urge mixed urinary incontinence.     Severity: Pt uses 0-1 pads a day and has tried oxybutynin and Myrbetriq in the past  Associated Sx: Pt has positive h/o daytime frequency, urgency and nocturia.     No h/o poor stream, straining, hesitancy or incomplete voiding.     No h/o recurrent UTI, hematuria,  Positive history of nephrolithiasis    No vaginal discharge or bleeding.  No h/o something coming out of vagina   Positive daily constipation  3 Vaginal deliveries    Past Medical History  Past Medical History:   Diagnosis Date    Abnormal Pap smear of cervix     Anemia     Depression     Gestational diabetes mellitus 2016    History of transfusion     HPV (human papilloma virus) infection     Hyperlipidemia     Kidney stone     Multiple gestation     Ovarian cyst     Urinary incontinence        Past Surgical History  Past Surgical History:   Procedure Laterality Date     SECTION       SECTION WITH TUBAL  7/15/10    COLONOSCOPY      ENDOMETRIAL ABLATION      TUBAL ABDOMINAL LIGATION      UPPER GASTROINTESTINAL ENDOSCOPY         Medications  Current Outpatient Medications   Medication Sig Dispense Refill    busPIRone (BUSPAR) 10 MG tablet Take 1 tablet by mouth 2 (Two) Times a Day As Needed.      citalopram (CeleXA) 40 MG tablet Take 1 tablet by mouth Daily. 90 tablet 1    cyanocobalamin 1000 MCG/ML injection Inject 1 mL into the appropriate muscle as directed by prescriber Every 28 (Twenty-Eight) Days. 3 mL 3    hydrocortisone (ANUSOL-HC) 2.5 % rectal cream Insert  into the rectum 2 (Two) Times a Day. 30 g 1    naproxen (NAPROSYN) 500 MG tablet Take 1 tablet by mouth 2 (Two) Times a Day With Meals. 270 tablet 1     "spironolactone (ALDACTONE) 25 MG tablet       Tirzepatide (MOUNJARO) 2.5 MG/0.5ML solution pen-injector pen Inject 0.5 mL under the skin into the appropriate area as directed 1 (One) Time Per Week.      traZODone (DESYREL) 50 MG tablet Take 1 tablet by mouth every night at bedtime. 90 tablet 1     No current facility-administered medications for this visit.       Allergies  Allergies   Allergen Reactions    Latex     Adhesive Tape Rash       Social History  Social History     Socioeconomic History    Marital status:    Tobacco Use    Smoking status: Former     Current packs/day: 0.00     Types: Cigarettes     Quit date: 2005     Years since quittin.8    Smokeless tobacco: Never   Vaping Use    Vaping status: Never Used   Substance and Sexual Activity    Alcohol use: Yes     Alcohol/week: 1.0 - 2.0 standard drink of alcohol     Types: 1 - 2 Glasses of wine per week     Comment: Occasionally    Drug use: No    Sexual activity: Yes     Partners: Male     Birth control/protection: Tubal ligation       Family History  Family History   Problem Relation Age of Onset    Coronary artery disease Father     Hypertension Father     Colon cancer Paternal Grandmother     Stroke Paternal Grandmother        Physical Exam  Visit Vitals  /84 (BP Location: Left arm, Patient Position: Sitting, Cuff Size: Adult)   Pulse 58   Ht 162.6 cm (64\")   Wt 98 kg (216 lb)   SpO2 100%   BMI 37.08 kg/m²     Physical exam was notable for obesity.    Labs  Brief Urine Lab Results       None            Lab Results   Component Value Date    GLUCOSE 74 2019    CALCIUM 8.9 2019     2019    K 3.9 2019    CO2 29.0 2019     2019    BUN 11 2019    CREATININE 0.80 2019    EGFRIFNONA 80 2019    BCR 13.8 2019    ANIONGAP 11.9 2019       Lab Results   Component Value Date    WBC 6.52 2018    HGB 13.1 2018    HCT 39.9 2018    MCV 89.5 2018 "     05/02/2018       PVR  Post-void residual performed by staff - 1 mL    I have personally reviewed her labs and post void residual imaging.     Assessment  Ms. Brooke is a 43 y.o. female with urge predominant mixed urinary incontinence.  She has failed multiple medicines including oxybutynin and Myrbetriq. She has tried PFPT in past for KENDELL.     We discussed the AUA guidelines for urge urinary incontinence.  We discussed lifestyle changes such as weight reduction and caffeine reduction, as well as medications such as anticholinergics and beta agonist.  We discussed third line therapies, such as Botox, InterStim, and PTNS.  The patient has elected botox.  We discussed the risks, benefits, and alternatives to this approach.  She voiced her understanding and wished to proceed.    We discussed the AUA guidelines for management of stress urinary incontinence.  We discussed served of management with lifestyle changes like weight reduction and pelvic floor physical therapy.  We discussed surgical management with either mid urethral sling versus cystoscopy with bulking agent injection.  The patient has elected bulkamid.  We discussed the risks, benefits, and alternatives.  The patient voiced her understanding and wished to proceed.    Plan  1. Schedule bulkamid at [place] after hysterectomy (cannot do it at the same time due to Mohamud catheter displacing it) and botox in office in the next 2 to 3 weeks.  We discussed risks and benefits including urinary retention.

## 2024-03-27 ENCOUNTER — TELEPHONE (OUTPATIENT)
Dept: PREADMISSION TESTING | Facility: HOSPITAL | Age: 44
End: 2024-03-27

## 2024-03-28 ENCOUNTER — TELEPHONE (OUTPATIENT)
Dept: PREADMISSION TESTING | Facility: HOSPITAL | Age: 44
End: 2024-03-28

## 2024-03-29 ENCOUNTER — PRE-ADMISSION TESTING (OUTPATIENT)
Dept: PREADMISSION TESTING | Facility: HOSPITAL | Age: 44
End: 2024-03-29
Payer: COMMERCIAL

## 2024-03-29 VITALS — BODY MASS INDEX: 35.85 KG/M2 | HEIGHT: 64 IN | WEIGHT: 210 LBS

## 2024-03-29 DIAGNOSIS — Z98.51 H/O TUBAL LIGATION: ICD-10-CM

## 2024-03-29 DIAGNOSIS — G89.29 CHRONIC PELVIC PAIN IN FEMALE: ICD-10-CM

## 2024-03-29 DIAGNOSIS — Z98.890 HISTORY OF ENDOMETRIAL ABLATION: ICD-10-CM

## 2024-03-29 DIAGNOSIS — R10.2 CHRONIC PELVIC PAIN IN FEMALE: ICD-10-CM

## 2024-03-29 LAB
ALBUMIN SERPL-MCNC: 4.3 G/DL (ref 3.5–5.2)
ALBUMIN/GLOB SERPL: 1.4 G/DL
ALP SERPL-CCNC: 71 U/L (ref 39–117)
ALT SERPL W P-5'-P-CCNC: 9 U/L (ref 1–33)
ANION GAP SERPL CALCULATED.3IONS-SCNC: 10.6 MMOL/L (ref 5–15)
AST SERPL-CCNC: 13 U/L (ref 1–32)
BASOPHILS # BLD AUTO: 0.04 10*3/MM3 (ref 0–0.2)
BASOPHILS NFR BLD AUTO: 0.5 % (ref 0–1.5)
BILIRUB SERPL-MCNC: 0.3 MG/DL (ref 0–1.2)
BILIRUB UR QL STRIP: NEGATIVE
BUN SERPL-MCNC: 18 MG/DL (ref 6–20)
BUN/CREAT SERPL: 19.4 (ref 7–25)
CALCIUM SPEC-SCNC: 8.9 MG/DL (ref 8.6–10.5)
CHLORIDE SERPL-SCNC: 101 MMOL/L (ref 98–107)
CLARITY UR: CLEAR
CO2 SERPL-SCNC: 27.4 MMOL/L (ref 22–29)
COLOR UR: YELLOW
CREAT SERPL-MCNC: 0.93 MG/DL (ref 0.57–1)
DEPRECATED RDW RBC AUTO: 39.8 FL (ref 37–54)
EGFRCR SERPLBLD CKD-EPI 2021: 78.4 ML/MIN/1.73
EOSINOPHIL # BLD AUTO: 0.19 10*3/MM3 (ref 0–0.4)
EOSINOPHIL NFR BLD AUTO: 2.2 % (ref 0.3–6.2)
ERYTHROCYTE [DISTWIDTH] IN BLOOD BY AUTOMATED COUNT: 12.5 % (ref 12.3–15.4)
GLOBULIN UR ELPH-MCNC: 3 GM/DL
GLUCOSE SERPL-MCNC: 66 MG/DL (ref 65–99)
GLUCOSE UR STRIP-MCNC: NEGATIVE MG/DL
HCT VFR BLD AUTO: 37.5 % (ref 34–46.6)
HGB BLD-MCNC: 12.6 G/DL (ref 12–15.9)
HGB UR QL STRIP.AUTO: NEGATIVE
IMM GRANULOCYTES # BLD AUTO: 0.02 10*3/MM3 (ref 0–0.05)
IMM GRANULOCYTES NFR BLD AUTO: 0.2 % (ref 0–0.5)
KETONES UR QL STRIP: ABNORMAL
LEUKOCYTE ESTERASE UR QL STRIP.AUTO: NEGATIVE
LYMPHOCYTES # BLD AUTO: 2.91 10*3/MM3 (ref 0.7–3.1)
LYMPHOCYTES NFR BLD AUTO: 33 % (ref 19.6–45.3)
MCH RBC QN AUTO: 29.6 PG (ref 26.6–33)
MCHC RBC AUTO-ENTMCNC: 33.6 G/DL (ref 31.5–35.7)
MCV RBC AUTO: 88.2 FL (ref 79–97)
MONOCYTES # BLD AUTO: 0.54 10*3/MM3 (ref 0.1–0.9)
MONOCYTES NFR BLD AUTO: 6.1 % (ref 5–12)
NEUTROPHILS NFR BLD AUTO: 5.13 10*3/MM3 (ref 1.7–7)
NEUTROPHILS NFR BLD AUTO: 58 % (ref 42.7–76)
NITRITE UR QL STRIP: NEGATIVE
NRBC BLD AUTO-RTO: 0 /100 WBC (ref 0–0.2)
PH UR STRIP.AUTO: 5.5 [PH] (ref 5–8)
PLATELET # BLD AUTO: 313 10*3/MM3 (ref 140–450)
PMV BLD AUTO: 10.2 FL (ref 6–12)
POTASSIUM SERPL-SCNC: 3.8 MMOL/L (ref 3.5–5.2)
PROT SERPL-MCNC: 7.3 G/DL (ref 6–8.5)
PROT UR QL STRIP: NEGATIVE
RBC # BLD AUTO: 4.25 10*6/MM3 (ref 3.77–5.28)
SODIUM SERPL-SCNC: 139 MMOL/L (ref 136–145)
SP GR UR STRIP: 1.03 (ref 1–1.03)
UROBILINOGEN UR QL STRIP: ABNORMAL
WBC NRBC COR # BLD AUTO: 8.83 10*3/MM3 (ref 3.4–10.8)

## 2024-03-29 PROCEDURE — 80053 COMPREHEN METABOLIC PANEL: CPT

## 2024-03-29 PROCEDURE — 36415 COLL VENOUS BLD VENIPUNCTURE: CPT

## 2024-03-29 PROCEDURE — 85025 COMPLETE CBC W/AUTO DIFF WBC: CPT

## 2024-03-29 PROCEDURE — 81003 URINALYSIS AUTO W/O SCOPE: CPT

## 2024-03-29 NOTE — DISCHARGE INSTRUCTIONS
Pre-Admission testing appointment completed today for patient's upcoming procedure here at [facility].    PAT PASS reviewed with patient and they verbalize understanding of the following:     Do not eat or drink anything after midnight the night before procedure unless otherwise instructed by physician/surgeon's office, this includes no gum, candy, mints, tobacco products or e-cigarettes.  Do not shave the area to be operated on at least 48 hours prior to procedure.  Do not wear makeup, lotion, hair products, or nail polish.  Do not wear any jewelry and remove all piercings.  Do not wear any adhesive if you wear dentures.  Do not wear contacts; bring in glasses if needed.  Only take medications on the morning of procedure as instructed by PAT nurse per anesthesia guidelines or as instructed by physician's office.  If you are on any blood thinners reach out to the physician/surgeon's office for instructions on when/if they will need to be stopped prior to procedure.  Bring in picture ID and insurance card, advanced directive copies if applicable, CPAP/BIPAP/Inhalers if indicated morning of procedure, leave any other valuables at home.  Ensure you have arranged for someone to drive you home the day of your procedure and someone to care for you at home afterwards. It is recommended that you do not drive, drink alcohol, or make any major legal decisions for at least 24 hours after your procedure is complete.  Chlorhexadine wipes along with instruction/information sheet given to patient if indicated. Instructed patient to date, time, and initial the verification sheet once skin prep has been  completed, and to return to [facility] the day of the procedure.     Introduction to anesthesia video watched by patient during appointment and anesthesia FAQ tip sheet given to patient.  Instructions and information given to patient about parking, hospital entrance, and registration location.Chlorhexidine wipes along with  instruction/verification sheet given to pt.  Instructed pt to date, time, and initial the verification sheet once skin prep has been  completed, and to return to Same Day Surgery the day of the procedure.  Pt. Verbalizes understanding. Introduction to anesthesia video viewed by pt in [facility].

## 2024-04-22 ENCOUNTER — ANESTHESIA EVENT CONVERTED (OUTPATIENT)
Dept: ANESTHESIOLOGY | Facility: HOSPITAL | Age: 44
End: 2024-04-22
Payer: COMMERCIAL

## 2024-04-22 ENCOUNTER — HOSPITAL ENCOUNTER (INPATIENT)
Facility: HOSPITAL | Age: 44
LOS: 2 days | Discharge: HOME | End: 2024-04-24
Attending: OBSTETRICS & GYNECOLOGY | Admitting: OBSTETRICS & GYNECOLOGY
Payer: COMMERCIAL

## 2024-04-22 ENCOUNTER — ANESTHESIA EVENT (OUTPATIENT)
Dept: PERIOP | Facility: HOSPITAL | Age: 44
End: 2024-04-22
Payer: COMMERCIAL

## 2024-04-22 ENCOUNTER — ANESTHESIA (OUTPATIENT)
Dept: PERIOP | Facility: HOSPITAL | Age: 44
End: 2024-04-22
Payer: COMMERCIAL

## 2024-04-22 DIAGNOSIS — G89.29 CHRONIC PELVIC PAIN IN FEMALE: ICD-10-CM

## 2024-04-22 DIAGNOSIS — Z90.710 S/P TOTAL ABDOMINAL HYSTERECTOMY: Primary | ICD-10-CM

## 2024-04-22 DIAGNOSIS — Z98.890 HISTORY OF ENDOMETRIAL ABLATION: ICD-10-CM

## 2024-04-22 DIAGNOSIS — Z98.51 H/O TUBAL LIGATION: ICD-10-CM

## 2024-04-22 DIAGNOSIS — R10.2 CHRONIC PELVIC PAIN IN FEMALE: ICD-10-CM

## 2024-04-22 LAB
ABO GROUP BLD: NORMAL
B-HCG UR QL: NEGATIVE
BLD GP AB SCN SERPL QL: NEGATIVE
EXPIRATION DATE: NORMAL
GLUCOSE BLDC GLUCOMTR-MCNC: 191 MG/DL (ref 70–130)
GLUCOSE BLDC GLUCOMTR-MCNC: 217 MG/DL (ref 70–130)
GLUCOSE BLDC GLUCOMTR-MCNC: 281 MG/DL (ref 70–130)
GLUCOSE BLDC GLUCOMTR-MCNC: 71 MG/DL (ref 70–130)
INTERNAL NEGATIVE CONTROL: NORMAL
INTERNAL POSITIVE CONTROL: NORMAL
Lab: NORMAL
RH BLD: POSITIVE
T&S EXPIRATION DATE: NORMAL

## 2024-04-22 PROCEDURE — 25010000002 FENTANYL CITRATE (PF) 50 MCG/ML SOLUTION PREFILLED SYRINGE: Performed by: NURSE ANESTHETIST, CERTIFIED REGISTERED

## 2024-04-22 PROCEDURE — 25010000002 MEPERIDINE PER 100 MG: Performed by: NURSE ANESTHETIST, CERTIFIED REGISTERED

## 2024-04-22 PROCEDURE — G0378 HOSPITAL OBSERVATION PER HR: HCPCS

## 2024-04-22 PROCEDURE — 25810000003 LACTATED RINGERS PER 1000 ML: Performed by: OBSTETRICS & GYNECOLOGY

## 2024-04-22 PROCEDURE — 63710000001 INSULIN LISPRO (HUMAN) PER 5 UNITS: Performed by: OBSTETRICS & GYNECOLOGY

## 2024-04-22 PROCEDURE — 82948 REAGENT STRIP/BLOOD GLUCOSE: CPT

## 2024-04-22 PROCEDURE — 25010000002 ROPIVACAINE PER 1 MG: Performed by: NURSE ANESTHETIST, CERTIFIED REGISTERED

## 2024-04-22 PROCEDURE — 86850 RBC ANTIBODY SCREEN: CPT | Performed by: OBSTETRICS & GYNECOLOGY

## 2024-04-22 PROCEDURE — 25010000002 PROPOFOL 200 MG/20ML EMULSION: Performed by: NURSE ANESTHETIST, CERTIFIED REGISTERED

## 2024-04-22 PROCEDURE — 86900 BLOOD TYPING SEROLOGIC ABO: CPT | Performed by: OBSTETRICS & GYNECOLOGY

## 2024-04-22 PROCEDURE — 0TJB8ZZ INSPECTION OF BLADDER, VIA NATURAL OR ARTIFICIAL OPENING ENDOSCOPIC: ICD-10-PCS | Performed by: OBSTETRICS & GYNECOLOGY

## 2024-04-22 PROCEDURE — 81025 URINE PREGNANCY TEST: CPT | Performed by: OBSTETRICS & GYNECOLOGY

## 2024-04-22 PROCEDURE — 25010000002 HYDROMORPHONE PER 4 MG: Performed by: NURSE ANESTHETIST, CERTIFIED REGISTERED

## 2024-04-22 PROCEDURE — 25010000002 DEXAMETHASONE PER 1 MG: Performed by: NURSE ANESTHETIST, CERTIFIED REGISTERED

## 2024-04-22 PROCEDURE — 58150 TOTAL HYSTERECTOMY: CPT | Performed by: OBSTETRICS & GYNECOLOGY

## 2024-04-22 PROCEDURE — 82948 REAGENT STRIP/BLOOD GLUCOSE: CPT | Performed by: OBSTETRICS & GYNECOLOGY

## 2024-04-22 PROCEDURE — 25010000002 HYDROMORPHONE 1 MG/ML SOLUTION: Performed by: OBSTETRICS & GYNECOLOGY

## 2024-04-22 PROCEDURE — 25010000002 MIDAZOLAM PER 1MG: Performed by: NURSE ANESTHETIST, CERTIFIED REGISTERED

## 2024-04-22 PROCEDURE — S0260 H&P FOR SURGERY: HCPCS | Performed by: OBSTETRICS & GYNECOLOGY

## 2024-04-22 PROCEDURE — 25010000002 ONDANSETRON PER 1 MG: Performed by: NURSE ANESTHETIST, CERTIFIED REGISTERED

## 2024-04-22 PROCEDURE — 86901 BLOOD TYPING SEROLOGIC RH(D): CPT | Performed by: OBSTETRICS & GYNECOLOGY

## 2024-04-22 PROCEDURE — 0UT70ZZ RESECTION OF BILATERAL FALLOPIAN TUBES, OPEN APPROACH: ICD-10-PCS | Performed by: OBSTETRICS & GYNECOLOGY

## 2024-04-22 PROCEDURE — 25010000002 HYDROMORPHONE 1 MG/ML SOLUTION: Performed by: NURSE ANESTHETIST, CERTIFIED REGISTERED

## 2024-04-22 PROCEDURE — 25010000002 CEFAZOLIN PER 500 MG: Performed by: OBSTETRICS & GYNECOLOGY

## 2024-04-22 PROCEDURE — 0U910ZZ DRAINAGE OF LEFT OVARY, OPEN APPROACH: ICD-10-PCS | Performed by: OBSTETRICS & GYNECOLOGY

## 2024-04-22 PROCEDURE — 0UT90ZZ RESECTION OF UTERUS, OPEN APPROACH: ICD-10-PCS | Performed by: OBSTETRICS & GYNECOLOGY

## 2024-04-22 DEVICE — ABSORBABLE HEMOSTAT (OXIDIZED REGENERATED CELLULOSE)
Type: IMPLANTABLE DEVICE | Site: ABDOMEN | Status: FUNCTIONAL
Brand: SURGICEL

## 2024-04-22 RX ORDER — DOCUSATE SODIUM 100 MG/1
100 CAPSULE, LIQUID FILLED ORAL 2 TIMES DAILY
Status: DISCONTINUED | OUTPATIENT
Start: 2024-04-22 | End: 2024-04-24 | Stop reason: HOSPADM

## 2024-04-22 RX ORDER — SIMETHICONE 80 MG
80 TABLET,CHEWABLE ORAL 4 TIMES DAILY PRN
Status: DISCONTINUED | OUTPATIENT
Start: 2024-04-22 | End: 2024-04-24 | Stop reason: HOSPADM

## 2024-04-22 RX ORDER — PROPOFOL 10 MG/ML
INJECTION, EMULSION INTRAVENOUS AS NEEDED
Status: DISCONTINUED | OUTPATIENT
Start: 2024-04-22 | End: 2024-04-22 | Stop reason: SURG

## 2024-04-22 RX ORDER — DEXTROSE AND SODIUM CHLORIDE 5; .45 G/100ML; G/100ML
75 INJECTION, SOLUTION INTRAVENOUS CONTINUOUS
Status: DISCONTINUED | OUTPATIENT
Start: 2024-04-22 | End: 2024-04-24 | Stop reason: HOSPADM

## 2024-04-22 RX ORDER — FAMOTIDINE 20 MG/1
20 TABLET, FILM COATED ORAL 2 TIMES DAILY
Status: DISCONTINUED | OUTPATIENT
Start: 2024-04-22 | End: 2024-04-24 | Stop reason: HOSPADM

## 2024-04-22 RX ORDER — PHENAZOPYRIDINE HYDROCHLORIDE 100 MG/1
200 TABLET, FILM COATED ORAL ONCE
Status: COMPLETED | OUTPATIENT
Start: 2024-04-22 | End: 2024-04-22

## 2024-04-22 RX ORDER — MIDAZOLAM HYDROCHLORIDE 2 MG/2ML
INJECTION, SOLUTION INTRAMUSCULAR; INTRAVENOUS AS NEEDED
Status: DISCONTINUED | OUTPATIENT
Start: 2024-04-22 | End: 2024-04-22 | Stop reason: SURG

## 2024-04-22 RX ORDER — CITALOPRAM 20 MG/1
40 TABLET ORAL DAILY
Status: DISCONTINUED | OUTPATIENT
Start: 2024-04-22 | End: 2024-04-24 | Stop reason: HOSPADM

## 2024-04-22 RX ORDER — OXYCODONE HYDROCHLORIDE 5 MG/1
10 TABLET ORAL EVERY 4 HOURS PRN
Status: DISCONTINUED | OUTPATIENT
Start: 2024-04-22 | End: 2024-04-24 | Stop reason: HOSPADM

## 2024-04-22 RX ORDER — ONDANSETRON 2 MG/ML
INJECTION INTRAMUSCULAR; INTRAVENOUS AS NEEDED
Status: DISCONTINUED | OUTPATIENT
Start: 2024-04-22 | End: 2024-04-22 | Stop reason: SURG

## 2024-04-22 RX ORDER — IBUPROFEN 800 MG/1
800 TABLET ORAL EVERY 8 HOURS
Status: DISCONTINUED | OUTPATIENT
Start: 2024-04-22 | End: 2024-04-24 | Stop reason: HOSPADM

## 2024-04-22 RX ORDER — PROMETHAZINE HYDROCHLORIDE 12.5 MG/1
12.5 TABLET ORAL EVERY 6 HOURS PRN
Status: DISCONTINUED | OUTPATIENT
Start: 2024-04-22 | End: 2024-04-24 | Stop reason: HOSPADM

## 2024-04-22 RX ORDER — OXYCODONE HYDROCHLORIDE 5 MG/1
5 TABLET ORAL EVERY 4 HOURS PRN
Status: DISCONTINUED | OUTPATIENT
Start: 2024-04-22 | End: 2024-04-24 | Stop reason: HOSPADM

## 2024-04-22 RX ORDER — LIDOCAINE HCL/PF 100 MG/5ML
SYRINGE (ML) INJECTION AS NEEDED
Status: DISCONTINUED | OUTPATIENT
Start: 2024-04-22 | End: 2024-04-22 | Stop reason: SURG

## 2024-04-22 RX ORDER — PROMETHAZINE HYDROCHLORIDE 12.5 MG/1
12.5 SUPPOSITORY RECTAL EVERY 6 HOURS PRN
Status: DISCONTINUED | OUTPATIENT
Start: 2024-04-22 | End: 2024-04-24 | Stop reason: HOSPADM

## 2024-04-22 RX ORDER — MEPERIDINE HYDROCHLORIDE 25 MG/ML
25 INJECTION INTRAMUSCULAR; INTRAVENOUS; SUBCUTANEOUS ONCE
Status: COMPLETED | OUTPATIENT
Start: 2024-04-22 | End: 2024-04-22

## 2024-04-22 RX ORDER — INSULIN LISPRO 100 [IU]/ML
2-5 INJECTION, SOLUTION INTRAVENOUS; SUBCUTANEOUS
Status: DISCONTINUED | OUTPATIENT
Start: 2024-04-22 | End: 2024-04-24 | Stop reason: HOSPADM

## 2024-04-22 RX ORDER — MAGNESIUM HYDROXIDE 1200 MG/15ML
LIQUID ORAL AS NEEDED
Status: DISCONTINUED | OUTPATIENT
Start: 2024-04-22 | End: 2024-04-22 | Stop reason: HOSPADM

## 2024-04-22 RX ORDER — SUCCINYLCHOLINE/SOD CL,ISO/PF 200MG/10ML
SYRINGE (ML) INTRAVENOUS AS NEEDED
Status: DISCONTINUED | OUTPATIENT
Start: 2024-04-22 | End: 2024-04-22 | Stop reason: SURG

## 2024-04-22 RX ORDER — SODIUM CHLORIDE 9 MG/ML
40 INJECTION, SOLUTION INTRAVENOUS AS NEEDED
Status: DISCONTINUED | OUTPATIENT
Start: 2024-04-22 | End: 2024-04-22 | Stop reason: HOSPADM

## 2024-04-22 RX ORDER — ACETAMINOPHEN 500 MG
1000 TABLET ORAL EVERY 8 HOURS
Status: DISCONTINUED | OUTPATIENT
Start: 2024-04-22 | End: 2024-04-24 | Stop reason: HOSPADM

## 2024-04-22 RX ORDER — ROCURONIUM BROMIDE 50 MG/5 ML
SYRINGE (ML) INTRAVENOUS AS NEEDED
Status: DISCONTINUED | OUTPATIENT
Start: 2024-04-22 | End: 2024-04-22 | Stop reason: SURG

## 2024-04-22 RX ORDER — SODIUM CHLORIDE 0.9 % (FLUSH) 0.9 %
10 SYRINGE (ML) INJECTION AS NEEDED
Status: DISCONTINUED | OUTPATIENT
Start: 2024-04-22 | End: 2024-04-22 | Stop reason: HOSPADM

## 2024-04-22 RX ORDER — HYDROMORPHONE HYDROCHLORIDE 2 MG/ML
INJECTION, SOLUTION INTRAMUSCULAR; INTRAVENOUS; SUBCUTANEOUS AS NEEDED
Status: DISCONTINUED | OUTPATIENT
Start: 2024-04-22 | End: 2024-04-22 | Stop reason: SURG

## 2024-04-22 RX ORDER — ONDANSETRON 2 MG/ML
4 INJECTION INTRAMUSCULAR; INTRAVENOUS EVERY 6 HOURS PRN
Status: DISCONTINUED | OUTPATIENT
Start: 2024-04-22 | End: 2024-04-24 | Stop reason: HOSPADM

## 2024-04-22 RX ORDER — FENTANYL CITRATE 50 UG/ML
INJECTION, SOLUTION INTRAMUSCULAR; INTRAVENOUS AS NEEDED
Status: DISCONTINUED | OUTPATIENT
Start: 2024-04-22 | End: 2024-04-22 | Stop reason: SURG

## 2024-04-22 RX ORDER — DEXAMETHASONE SODIUM PHOSPHATE 4 MG/ML
INJECTION, SOLUTION INTRA-ARTICULAR; INTRALESIONAL; INTRAMUSCULAR; INTRAVENOUS; SOFT TISSUE AS NEEDED
Status: DISCONTINUED | OUTPATIENT
Start: 2024-04-22 | End: 2024-04-22 | Stop reason: SURG

## 2024-04-22 RX ORDER — SODIUM CHLORIDE 0.9 % (FLUSH) 0.9 %
10 SYRINGE (ML) INJECTION EVERY 12 HOURS SCHEDULED
Status: DISCONTINUED | OUTPATIENT
Start: 2024-04-22 | End: 2024-04-22 | Stop reason: HOSPADM

## 2024-04-22 RX ORDER — NALOXONE HCL 0.4 MG/ML
0.1 VIAL (ML) INJECTION
Status: DISCONTINUED | OUTPATIENT
Start: 2024-04-22 | End: 2024-04-24 | Stop reason: HOSPADM

## 2024-04-22 RX ORDER — ONDANSETRON 4 MG/1
4 TABLET, ORALLY DISINTEGRATING ORAL EVERY 6 HOURS PRN
Status: DISCONTINUED | OUTPATIENT
Start: 2024-04-22 | End: 2024-04-24 | Stop reason: HOSPADM

## 2024-04-22 RX ORDER — SODIUM CHLORIDE, SODIUM LACTATE, POTASSIUM CHLORIDE, CALCIUM CHLORIDE 600; 310; 30; 20 MG/100ML; MG/100ML; MG/100ML; MG/100ML
1000 INJECTION, SOLUTION INTRAVENOUS CONTINUOUS
Status: DISCONTINUED | OUTPATIENT
Start: 2024-04-22 | End: 2024-04-22

## 2024-04-22 RX ORDER — CITALOPRAM 20 MG/1
40 TABLET ORAL DAILY
Status: DISCONTINUED | OUTPATIENT
Start: 2024-04-23 | End: 2024-04-22

## 2024-04-22 RX ORDER — ALUMINA, MAGNESIA, AND SIMETHICONE 2400; 2400; 240 MG/30ML; MG/30ML; MG/30ML
15 SUSPENSION ORAL EVERY 6 HOURS
Status: DISCONTINUED | OUTPATIENT
Start: 2024-04-22 | End: 2024-04-24 | Stop reason: HOSPADM

## 2024-04-22 RX ORDER — ROPIVACAINE HYDROCHLORIDE 2 MG/ML
INJECTION, SOLUTION EPIDURAL; INFILTRATION; PERINEURAL
Status: DISCONTINUED | OUTPATIENT
Start: 2024-04-22 | End: 2024-04-22 | Stop reason: SURG

## 2024-04-22 RX ORDER — ONDANSETRON 2 MG/ML
4 INJECTION INTRAMUSCULAR; INTRAVENOUS ONCE AS NEEDED
Status: DISCONTINUED | OUTPATIENT
Start: 2024-04-22 | End: 2024-04-22 | Stop reason: HOSPADM

## 2024-04-22 RX ORDER — TRAZODONE HYDROCHLORIDE 50 MG/1
100 TABLET ORAL NIGHTLY
Status: DISCONTINUED | OUTPATIENT
Start: 2024-04-22 | End: 2024-04-24 | Stop reason: HOSPADM

## 2024-04-22 RX ORDER — CALCIUM CARBONATE 500 MG/1
1 TABLET, CHEWABLE ORAL 3 TIMES DAILY PRN
Status: DISCONTINUED | OUTPATIENT
Start: 2024-04-22 | End: 2024-04-24 | Stop reason: HOSPADM

## 2024-04-22 RX ORDER — SPIRONOLACTONE 25 MG/1
25 TABLET ORAL DAILY
Status: DISCONTINUED | OUTPATIENT
Start: 2024-04-23 | End: 2024-04-24 | Stop reason: HOSPADM

## 2024-04-22 RX ADMIN — FENTANYL CITRATE 50 MCG: 50 INJECTION, SOLUTION INTRAMUSCULAR; INTRAVENOUS at 07:55

## 2024-04-22 RX ADMIN — DEXAMETHASONE SODIUM PHOSPHATE 8 MG: 4 INJECTION, SOLUTION INTRA-ARTICULAR; INTRALESIONAL; INTRAMUSCULAR; INTRAVENOUS; SOFT TISSUE at 07:35

## 2024-04-22 RX ADMIN — ONDANSETRON 4 MG: 2 INJECTION INTRAMUSCULAR; INTRAVENOUS at 07:29

## 2024-04-22 RX ADMIN — FENTANYL CITRATE 50 MCG: 50 INJECTION, SOLUTION INTRAMUSCULAR; INTRAVENOUS at 09:17

## 2024-04-22 RX ADMIN — FENTANYL CITRATE 50 MCG: 50 INJECTION, SOLUTION INTRAMUSCULAR; INTRAVENOUS at 10:15

## 2024-04-22 RX ADMIN — IBUPROFEN 800 MG: 800 TABLET, FILM COATED ORAL at 13:11

## 2024-04-22 RX ADMIN — FENTANYL CITRATE 50 MCG: 50 INJECTION, SOLUTION INTRAMUSCULAR; INTRAVENOUS at 07:29

## 2024-04-22 RX ADMIN — SODIUM CHLORIDE 2000 MG: 9 INJECTION, SOLUTION INTRAVENOUS at 07:29

## 2024-04-22 RX ADMIN — Medication 160 MG: at 07:33

## 2024-04-22 RX ADMIN — Medication 30 MG: at 09:12

## 2024-04-22 RX ADMIN — PHENAZOPYRIDINE HYDROCHLORIDE 200 MG: 100 TABLET ORAL at 07:10

## 2024-04-22 RX ADMIN — OXYCODONE HYDROCHLORIDE 10 MG: 5 TABLET ORAL at 18:04

## 2024-04-22 RX ADMIN — Medication 40 MG: at 07:33

## 2024-04-22 RX ADMIN — FAMOTIDINE 20 MG: 20 TABLET, FILM COATED ORAL at 21:37

## 2024-04-22 RX ADMIN — MIDAZOLAM HYDROCHLORIDE 2 MG: 1 INJECTION, SOLUTION INTRAMUSCULAR; INTRAVENOUS at 07:29

## 2024-04-22 RX ADMIN — ACETAMINOPHEN 1000 MG: 500 TABLET ORAL at 15:49

## 2024-04-22 RX ADMIN — DOCUSATE SODIUM 100 MG: 100 CAPSULE, LIQUID FILLED ORAL at 21:37

## 2024-04-22 RX ADMIN — DEXTROSE AND SODIUM CHLORIDE 75 ML/HR: 5; 450 INJECTION, SOLUTION INTRAVENOUS at 13:12

## 2024-04-22 RX ADMIN — PROPOFOL 200 MG: 10 INJECTION, EMULSION INTRAVENOUS at 07:33

## 2024-04-22 RX ADMIN — SODIUM CHLORIDE, POTASSIUM CHLORIDE, SODIUM LACTATE AND CALCIUM CHLORIDE: 600; 310; 30; 20 INJECTION, SOLUTION INTRAVENOUS at 11:14

## 2024-04-22 RX ADMIN — ALUMINUM HYDROXIDE, MAGNESIUM HYDROXIDE, AND DIMETHICONE 15 ML: 400; 400; 40 SUSPENSION ORAL at 13:12

## 2024-04-22 RX ADMIN — MEPERIDINE HYDROCHLORIDE 25 MG: 25 INJECTION INTRAMUSCULAR; INTRAVENOUS; SUBCUTANEOUS at 12:01

## 2024-04-22 RX ADMIN — HYDROMORPHONE HYDROCHLORIDE 0.5 MG: 1 INJECTION, SOLUTION INTRAMUSCULAR; INTRAVENOUS; SUBCUTANEOUS at 20:27

## 2024-04-22 RX ADMIN — OXYCODONE HYDROCHLORIDE 10 MG: 5 TABLET ORAL at 13:50

## 2024-04-22 RX ADMIN — SODIUM CHLORIDE, POTASSIUM CHLORIDE, SODIUM LACTATE AND CALCIUM CHLORIDE: 600; 310; 30; 20 INJECTION, SOLUTION INTRAVENOUS at 09:51

## 2024-04-22 RX ADMIN — IBUPROFEN 800 MG: 800 TABLET, FILM COATED ORAL at 21:37

## 2024-04-22 RX ADMIN — ALUMINUM HYDROXIDE, MAGNESIUM HYDROXIDE, AND DIMETHICONE 15 ML: 400; 400; 40 SUSPENSION ORAL at 20:24

## 2024-04-22 RX ADMIN — HYDROMORPHONE HYDROCHLORIDE 0.5 MG: 1 INJECTION, SOLUTION INTRAMUSCULAR; INTRAVENOUS; SUBCUTANEOUS at 12:13

## 2024-04-22 RX ADMIN — Medication 20 MG: at 09:26

## 2024-04-22 RX ADMIN — SODIUM CHLORIDE, POTASSIUM CHLORIDE, SODIUM LACTATE AND CALCIUM CHLORIDE 1000 ML: 600; 310; 30; 20 INJECTION, SOLUTION INTRAVENOUS at 07:17

## 2024-04-22 RX ADMIN — INSULIN LISPRO 6 UNITS: 100 INJECTION, SOLUTION INTRAVENOUS; SUBCUTANEOUS at 18:05

## 2024-04-22 RX ADMIN — ROPIVACAINE HYDROCHLORIDE 60 ML: 2 INJECTION, SOLUTION EPIDURAL; INFILTRATION at 11:54

## 2024-04-22 RX ADMIN — HYDROMORPHONE HYDROCHLORIDE 2 MG: 2 INJECTION, SOLUTION INTRAMUSCULAR; INTRAVENOUS; SUBCUTANEOUS at 10:58

## 2024-04-22 RX ADMIN — TRAZODONE HYDROCHLORIDE 100 MG: 50 TABLET ORAL at 21:41

## 2024-04-22 RX ADMIN — Medication 10 MG: at 07:33

## 2024-04-22 RX ADMIN — CITALOPRAM HYDROBROMIDE 40 MG: 20 TABLET ORAL at 14:19

## 2024-04-22 RX ADMIN — SODIUM CHLORIDE, POTASSIUM CHLORIDE, SODIUM LACTATE AND CALCIUM CHLORIDE: 600; 310; 30; 20 INJECTION, SOLUTION INTRAVENOUS at 08:21

## 2024-04-22 RX ADMIN — Medication 40 MG: at 07:50

## 2024-04-22 RX ADMIN — FENTANYL CITRATE 50 MCG: 50 INJECTION, SOLUTION INTRAMUSCULAR; INTRAVENOUS at 09:14

## 2024-04-22 NOTE — ANESTHESIA POSTPROCEDURE EVALUATION
Patient: Priscilla Brooke    Procedure Summary       Date: 04/22/24 Room / Location: [facility] OR 1 /  [facility]    Anesthesia Start: 0729 Anesthesia Stop: 1139    Procedure: ATTEMPTED TOTAL LAPAROSCOPIC HYSTERECTOMY, CONVERTED TO OPEN ABDOMINAL HYSTERECTOMY, BILATERAL SALPINGECTOMY, SIGNIFICANT LYSIS OF ADHESIONS, DRAINAGE OF LEFT OVARIAN CYSTS, CYSTOSCOPY (Abdomen) Diagnosis:       Chronic pelvic pain in female      History of endometrial ablation      H/O tubal ligation      (Chronic pelvic pain in female [R10.2, G89.29])      (History of endometrial ablation [Z98.890])      (H/O tubal ligation [Z98.51])    Surgeons: Ruben Malagon MD Provider: Aaron Boyle CRNA    Anesthesia Type: general ASA Status: 2            Anesthesia Type: general    Vitals  HR 99  Resp 12  Sat 99  /81  Temp 97.9        Post Anesthesia Care and Evaluation    Patient location during evaluation: PACU  Patient participation: complete - patient participated  Level of consciousness: awake and alert and sleepy but conscious  Pain management: satisfactory to patient    Airway patency: patent  Anesthetic complications: No anesthetic complications    Cardiovascular status: acceptable and stable  Respiratory status: acceptable and face mask  Hydration status: acceptable

## 2024-04-22 NOTE — H&P
GYNECOLOGY HISTORY AND PHYSICAL    CHIEF COMPLAINT: Scheduled surgery    DIAGNOSIS:  Post-ablation Tubal Sterilization Syndrome (PATSS)  Severe pelvic pain  Previous endometrial ablation + BTL  Abnormal Pap smear: NILM, HPV +, 16/18 NEG  BMI 36    ASSESSMENT/PLAN     43 y.o.  female who presents for scheduled TLH, BS, Cysto and all other indicated procedures.    - Proceed to the OR for scheduled surgery  - Risks for the procedure reviewed  - SCDs for DVT ppx  - Antibiotics: Ancef 2g    HISTORY OF PRESENT ILLNESS     43 y.o.  female who presents for the scheduled procedure listed above.    She has no complaints today and there are no interval changes to the history.    REVIEW OF SYSTEMS: A complete review of systems was performed and was specifically negative for headache, changes in vision, RUQ pain, shortness of breath, chest pain, lower extremity edema and dysuria.     HISTORY:  Obstetrical History:  OB History    Para Term  AB Living   4 4 4     4   SAB IAB Ectopic Molar Multiple Live Births             4      # Outcome Date GA Lbr Lloyd/2nd Weight Sex Type Anes PTL Lv   4 Term      CS-LTranv   ERIK   3 Term      Vag-Spont   ERIK   2 Term      Vag-Spont   ERIK   1 Term      Vag-Spont   ERIK       Past Medical History:  Past Medical History:   Diagnosis Date    Abnormal Pap smear of cervix     Anemia     Anxiety     Body piercing     bilateral ears, left nare    Depression     Diverticulosis     Gestational diabetes mellitus 2016    History of transfusion     HPV (human papilloma virus) infection     Hyperlipidemia     Kidney stone     Multiple gestation     Ovarian cyst     Tattoos     Urinary incontinence     Wears glasses        Past Surgical History:  Past Surgical History:   Procedure Laterality Date     SECTION       SECTION WITH TUBAL  7/15/10    COLONOSCOPY  2012    EAR TUBES      ENDOMETRIAL ABLATION      UPPER GASTROINTESTINAL ENDOSCOPY          Social History:  Social History     Tobacco Use    Smoking status: Former     Current packs/day: 0.00     Types: Cigarettes     Quit date: 2005     Years since quittin.9    Smokeless tobacco: Never   Vaping Use    Vaping status: Never Used   Substance Use Topics    Alcohol use: Not Currently     Comment: Occasionally    Drug use: No       Family History  Family History   Problem Relation Age of Onset    Coronary artery disease Father     Hypertension Father     Colon cancer Paternal Grandmother     Stroke Paternal Grandmother         Allergies:   Allergies   Allergen Reactions    Latex     Adhesive Tape Rash       OBJECTIVE   VITALS:   See Flowsheet    PHYSICAL EXAM:  GENERAL: NAD, alert  CHEST: No increased work of breathing, CTAB  CV: RRR, WWP  ABDOMEN: Soft, NTTP  EXTREMITIES:  Warm and well-perfused, nontender, nonedematous  NEURO: AAO x 3, CN II-XII grossly intact    No current facility-administered medications on file prior to encounter.     Current Outpatient Medications on File Prior to Encounter   Medication Sig Dispense Refill    citalopram (CeleXA) 40 MG tablet Take 1 tablet by mouth Daily. 90 tablet 1    cyanocobalamin 1000 MCG/ML injection Inject 1 mL into the appropriate muscle as directed by prescriber Every 28 (Twenty-Eight) Days. 3 mL 3    hydrocortisone (ANUSOL-HC) 2.5 % rectal cream Insert  into the rectum 2 (Two) Times a Day. 30 g 1    naproxen (NAPROSYN) 500 MG tablet Take 1 tablet by mouth 2 (Two) Times a Day With Meals. 270 tablet 1    spironolactone (ALDACTONE) 25 MG tablet Take 1 tablet by mouth Daily.      Tirzepatide (MOUNJARO) 2.5 MG/0.5ML solution pen-injector pen Inject 0.5 mL under the skin into the appropriate area as directed 1 (One) Time Per Week.      traZODone (DESYREL) 50 MG tablet Take 1 tablet by mouth every night at bedtime. (Patient taking differently: Take 2 tablets by mouth every night at bedtime.) 90 tablet 1       DIAGNOSTIC STUDIES:        Invalid  "input(s): \"LABALB\", \"UA\"    No results found for this or any previous visit (from the past 24 hour(s)).    Ruben Malagon MD  Obstetrics and Gynecology  [facility]     "

## 2024-04-22 NOTE — OP NOTE
CINTHYA Deinz Brooke  : 1980  MRN: [id number]  [facility]: [id number]  Date: 2024    Operative Report    Pre-op Diagnosis:  Post-ablation Tubal Sterilization Syndrome (PATSS)  Severe pelvic pain  Previous endometrial ablation + BTL  Abnormal Pap smear: NILM, HPV +, 16/18 NEG  BMI 36   Post-op Diagnosis:  Same  Significant pelvic adhesive disease   Procedure: Laparoscopic converted to total abdominal hysterectomy  Bilateral salpingectomy  Significant lysis of adhesions  Drainage of left ovarian cysts (2)  Cystoscopy    Surgeon:  Surgical assistant: RIGOBERTO Anderson was responsible for performing the following activities: Retraction, Suction, Closing, Placing Dressing, and manipulation of laparoscopic instruments  and their skilled assistance was necessary for the success of this case.     OR Staff: Circulator: Bhaskar Reyes, ZHANNA; Casey Morgan, ZHANNA; Kendra Delcid, RN  Scrub Person: Linda Elaine, PCT; Phan Moreno; Bud Holcomb     Anesthesia: General with Block   Estimated Blood Loss: 600 mls   ABx: cefazolin 2 gms   Specimens:  Uterus, cervix and bilateral fallopian tubes        Complications: None         Findings:   Normal survey of the abdomen and pelvis with normal anatomy noted.  No evidence of endometriosis. Dilated fallopian tubes.  2 small, simple cysts of the left ovary. Significant/dense adhesions between the bladder and the anterior surface of the uterus/cervix.  The bladder was normal in appearance with no signs of trauma and bilateral ureteral efflux noted on cystoscopy.    Description of Procedure:   After the appropriate time out and adequate dosing of her anesthesia, the patient was prepped and draped in the usual sterile fashion.  The patient was placed in the dorsal lithotomy position using Darren stirrups.  A saeed catheter had been placed in the bladder for drainage during the procedure.  A heavy-weighted speculum and Nazario retractor were placed in  the vagina.  The anterior lip of the cervix was grasped with a single tooth tenaculum.  The uterus was sounded.  Dense adhesions were encountered within the uterus.  The uterus was perforated with the sound.  The cervix was sequentially dilated.  Measurements were taken and an appropriate sized SLY manipulator was placed in the usual fashion with stay sutures at 3 and 9 O'Clock on the cervix. The manipulator was then attached to the Provasculon uterine positioning system and covered over with a sterile towel.     Attention was then turned to the abdomen.  An infraumbilical skin incision was made with the scalpel and a 5 mm trocar was inserted under direct visualization without any complications.  The abdomen was insufflated with CO2 being sure to keep the pressure less than 15 mmHg. The patient was placed in Trendelenburg position. Bilateral ancillary ports were placed with a 5 mm tocar in the left lower quadrant and a 12 mm port in the right lower quadrant with the aid of transillumination and after identification of the inferior epigastric vessels. The ports were placed under direct visualization without any complications and all entry sites were inspected and found to be atraumatic. The abdomen and pelvis were then explored with the above findings noted.  The ureters were identified bilaterally and noted to be out of the operative fields at all times during the case.      Adhesions were taken down with harmonic between the bladder and superior aspect of the anterior uterine surface. The left fallopian tube was elevated with an atraumatic grasper and pulled medially.  The Harmonic scalpel was then used to separate the fallopian tube from the underlying mesosalpinx.  The fallopian tube was fully excised and removed from the patient. The uterine-ovarian vessels were then transected using the Harmonic device.  The round ligament was then transected with the Harmonic and the anterior and posterior leaves of the broad  ligament were developed.  Dissection was then carried anteriorly and inferiorly along the anterior leaf of the broad ligament to create a bladder flap.  Dense adhesive disease was encountered here and careful dissection was required.  The bladder was backfilled with normal saline to clearly delineate its borders. A similar process was repeated on the patient's right side without any complications.     It became apparent that further laparoscopic dissection of the bladder flap was not wise. I opted to convert to an open procedure. The trocars were removed.  A Pfannenstiel incision was made with a scalpel along her previous  scar.  Dissection was carried down to the level of the fascia which was incised in the midline. Hemostasis was achieved with Bovie. The fascial incision was extended laterally in both directions.  The underlying rectus muscles were dissected off the fascia.  The peritoneal cavity was entered bluntly and extended with good visualization of the bladder.  An Casey retractor was placed.  The bowel was packed away with wet lap sponges.    Further development of the bladder flap was then carried out with the bladder backfilled as needed.  The bladder adhesions were particularly dense and difficult to take down.  Total time spent on adhesiolysis was 1 hour. The uterine vasculature on both sides was addressed with Rell clamps and 0 Vicryl sutures.  The uterus was disconnected from the cervix and the Zoey manipulator/ALLY were removed.  The cervix was elevated with Kocher clamps and straight Wilner clamps were used to take bites sequentially down the lateral aspects of the cervical stump. The cervical stump was then fully excised with curved Rell clamps.  The surgical specimen was labeled sent to pathology for review.  The vaginal cuff corners were tagged with 0 Vicryl sutures and the intervening vaginal cuff was closed with 0 Vicryl suture in a running locked fashion.  The left ovarian cysts  were drained with the Bovie.  The abdomen/pelvis was irrigated and suctioned.  Surgicel was placed along all surgical planes.  Adequate hemostasis was noted.  The Casey retractor was removed along with the packing sponges.  The incision was covered with a moist lap sponge.    Attention was then turned to the vagina where the saeed catheter was removed and the cystoscope was introduced into the bladder. Appropriate efflux of pyridium stained urine was noted from both ureters.  There was no evidence of bladder mucosal injury. A fresh saeed catheter was re-anchored.  I then returned to the abdomen for closure.  The fascia was closed with PDS suture.  The subcutaneous tissues were made hemostatic with the Bovie. The Pfannenstiel incision was closed just with Insorb staples. The laparoscopic incisions were closed with 3-0 Monocryl suture in a subcuticular fashion. All instruments were removed from the patient and all counts were correct at the end of the procedure.  There were no complications. The patient tolerated the procedure well.  She was taken to the postoperative recovery room in stable condition.    Ruben Malagon MD  Obstetrics and Gynecology  [facility]

## 2024-04-22 NOTE — ANESTHESIA PREPROCEDURE EVALUATION
Anesthesia Evaluation     Patient summary reviewed, Nursing notes reviewed and pregnancy test completed   no history of anesthetic complications:   NPO Solid Status: > 8 hours  NPO Liquid Status: > 8 hours           Airway   Mallampati: II  TM distance: >3 FB  Neck ROM: full  no difficulty expected and Possible difficult intubation  Dental - normal exam     Pulmonary - normal exam   (+) a smoker Former, cigarettes,  Cardiovascular - normal exam    (+) hyperlipidemia      Neuro/Psych  (+) numbness, psychiatric history Anxiety and Depression  GI/Hepatic/Renal/Endo    (+) renal disease-, diabetes mellitus    Musculoskeletal (-) negative ROS    Abdominal    Substance History - negative use     OB/GYN negative ob/gyn ROS   (-)  Pregnant        Other - negative ROS                   Anesthesia Plan    ASA 2     general     (Risks and benefits discussed including risk of aspiration, recall and dental damage. All patient questions answered.    Patient told that a breathing tube will be used to manage the airway.    Will continue with plan of care.)  intravenous induction     Anesthetic plan, risks, benefits, and alternatives have been provided, discussed and informed consent has been obtained with: patient.  Pre-procedure education provided    CODE STATUS:

## 2024-04-22 NOTE — ADDENDUM NOTE
Addendum  created 04/22/24 1154 by Severiano Soto CRNA    Child order released for a procedure order, Clinical Note Signed, Intraprocedure Blocks edited, SmartForm saved

## 2024-04-22 NOTE — ANESTHESIA PROCEDURE NOTES
Airway  Urgency: elective    Date/Time: 4/22/2024 7:34 AM  Airway not difficult    General Information and Staff    Patient location during procedure: OR  CRNA/CAA: Aaron Boyle, JOSEPH    Indications and Patient Condition  Indications for airway management: airway protection    Preoxygenated: yes  Mask difficulty assessment: 1 - vent by mask    Final Airway Details  Final airway type: endotracheal airway      Successful airway: ETT  Cuffed: yes   Successful intubation technique: direct laryngoscopy  Endotracheal tube insertion site: oral  Blade: Delcid  Blade size: 2  ETT size (mm): 7.5  Cormack-Lehane Classification: grade I - full view of glottis  Placement verified by: chest auscultation and capnometry   Measured from: lips  ETT/EBT  to lips (cm): 21  Number of attempts at approach: 1  Assessment: lips, teeth, and gum same as pre-op and atraumatic intubation    Additional Comments  Dentition and Lips as preoperative assesment

## 2024-04-22 NOTE — ANESTHESIA PROCEDURE NOTES
Peripheral Block      Patient reassessed immediately prior to procedure    Start time: 4/22/2024 11:43 AM  Stop time: 4/22/2024 11:53 AM  Reason for block: at surgeon's request and post-op pain management  Preanesthetic Checklist  Completed: patient identified, IV checked, site marked, risks and benefits discussed, surgical consent, monitors and equipment checked, pre-op evaluation and timeout performed  Prep:  Pt Position: supine  Sterile barriers:gloves, cap, sterile barriers and mask  Prep: ChloraPrep  Patient monitoring: blood pressure monitoring, continuous pulse oximetry and EKG  Procedure    Guidance:ultrasound guided  Images:still images obtained    Laterality:Bilateral  Block Type:TAP  Injection Technique:single-shot  Needle Type:echogenic  Needle Gauge:21 G  Resistance on Injection: none    Medications Used: ropivacaine (NAROPIN) 0.2% injection - Epidural   60 mL - 4/22/2024 11:54:00 AM      Medications  Comment:Block Injection: LA dose divided between Right and Left Block  Adjuncts per total volume of LA:    Decadron 10 mg PSF      If required, intravenous sedation was given -- see meds on anesthesia record.    Post Assessment  Injection Assessment: negative aspiration for heme, no paresthesia on injection and incremental injection  Patient Tolerance:comfortable throughout block  Complications:no  Additional Notes  Procedure:      BILATERAL TAP BLOCKS                             Patient analgesia was achieved with IV Sedation( see meds)      The pt was placed in the Supine Position and under Ultrasound guidance, an echogenic or touhy needle was advanced with Normal Saline hydro dissection of tissue.  The Internal Oblique and Transversus Abdominus muscles were visualized.  At or before the aponeurosis of Internal Oblique, the local anesthetic spread was visualized in the Transversus Abdominus Plane. Injection was made incrementally with aspiration every 5 mls.  There was no intravascular injection;   injection pressure was normal; there was no neural injection; and the procedure was completed without difficulty.

## 2024-04-23 PROBLEM — R10.2 PELVIC PAIN SYNDROME: Status: ACTIVE | Noted: 2024-04-23

## 2024-04-23 LAB
ABO GROUP BLD: NORMAL
ANION GAP SERPL CALCULATED.3IONS-SCNC: 7.8 MMOL/L (ref 5–15)
BASOPHILS # BLD AUTO: 0.03 10*3/MM3 (ref 0–0.2)
BASOPHILS NFR BLD AUTO: 0.2 % (ref 0–1.5)
BUN SERPL-MCNC: 8 MG/DL (ref 6–20)
BUN/CREAT SERPL: 9.9 (ref 7–25)
CALCIUM SPEC-SCNC: 8.5 MG/DL (ref 8.6–10.5)
CHLORIDE SERPL-SCNC: 105 MMOL/L (ref 98–107)
CO2 SERPL-SCNC: 25.2 MMOL/L (ref 22–29)
CREAT SERPL-MCNC: 0.81 MG/DL (ref 0.57–1)
DEPRECATED RDW RBC AUTO: 42.5 FL (ref 37–54)
EGFRCR SERPLBLD CKD-EPI 2021: 92.5 ML/MIN/1.73
EOSINOPHIL # BLD AUTO: 0.01 10*3/MM3 (ref 0–0.4)
EOSINOPHIL NFR BLD AUTO: 0.1 % (ref 0.3–6.2)
ERYTHROCYTE [DISTWIDTH] IN BLOOD BY AUTOMATED COUNT: 13.1 % (ref 12.3–15.4)
GLUCOSE BLDC GLUCOMTR-MCNC: 102 MG/DL (ref 70–130)
GLUCOSE BLDC GLUCOMTR-MCNC: 103 MG/DL (ref 70–130)
GLUCOSE BLDC GLUCOMTR-MCNC: 105 MG/DL (ref 70–130)
GLUCOSE BLDC GLUCOMTR-MCNC: 110 MG/DL (ref 70–130)
GLUCOSE BLDC GLUCOMTR-MCNC: 127 MG/DL (ref 70–130)
GLUCOSE BLDC GLUCOMTR-MCNC: 131 MG/DL (ref 70–130)
GLUCOSE SERPL-MCNC: 104 MG/DL (ref 65–99)
HCT VFR BLD AUTO: 29.4 % (ref 34–46.6)
HGB BLD-MCNC: 10 G/DL (ref 12–15.9)
IMM GRANULOCYTES # BLD AUTO: 0.1 10*3/MM3 (ref 0–0.05)
IMM GRANULOCYTES NFR BLD AUTO: 0.5 % (ref 0–0.5)
LYMPHOCYTES # BLD AUTO: 1.67 10*3/MM3 (ref 0.7–3.1)
LYMPHOCYTES NFR BLD AUTO: 8.5 % (ref 19.6–45.3)
MCH RBC QN AUTO: 30.1 PG (ref 26.6–33)
MCHC RBC AUTO-ENTMCNC: 34 G/DL (ref 31.5–35.7)
MCV RBC AUTO: 88.6 FL (ref 79–97)
MONOCYTES # BLD AUTO: 0.93 10*3/MM3 (ref 0.1–0.9)
MONOCYTES NFR BLD AUTO: 4.7 % (ref 5–12)
NEUTROPHILS NFR BLD AUTO: 16.85 10*3/MM3 (ref 1.7–7)
NEUTROPHILS NFR BLD AUTO: 86 % (ref 42.7–76)
NRBC BLD AUTO-RTO: 0 /100 WBC (ref 0–0.2)
PLATELET # BLD AUTO: 262 10*3/MM3 (ref 140–450)
PMV BLD AUTO: 10.3 FL (ref 6–12)
POTASSIUM SERPL-SCNC: 3.9 MMOL/L (ref 3.5–5.2)
RBC # BLD AUTO: 3.32 10*6/MM3 (ref 3.77–5.28)
RH BLD: POSITIVE
SODIUM SERPL-SCNC: 138 MMOL/L (ref 136–145)
WBC NRBC COR # BLD AUTO: 19.59 10*3/MM3 (ref 3.4–10.8)

## 2024-04-23 PROCEDURE — 82948 REAGENT STRIP/BLOOD GLUCOSE: CPT

## 2024-04-23 PROCEDURE — 85025 COMPLETE CBC W/AUTO DIFF WBC: CPT | Performed by: OBSTETRICS & GYNECOLOGY

## 2024-04-23 PROCEDURE — 86900 BLOOD TYPING SEROLOGIC ABO: CPT

## 2024-04-23 PROCEDURE — 80048 BASIC METABOLIC PNL TOTAL CA: CPT | Performed by: OBSTETRICS & GYNECOLOGY

## 2024-04-23 PROCEDURE — 25010000002 DIPHENHYDRAMINE PER 50 MG: Performed by: MIDWIFE

## 2024-04-23 PROCEDURE — 82948 REAGENT STRIP/BLOOD GLUCOSE: CPT | Performed by: OBSTETRICS & GYNECOLOGY

## 2024-04-23 PROCEDURE — 86901 BLOOD TYPING SEROLOGIC RH(D): CPT

## 2024-04-23 RX ORDER — HYDROXYZINE HYDROCHLORIDE 25 MG/1
25 TABLET, FILM COATED ORAL EVERY 4 HOURS PRN
Status: DISCONTINUED | OUTPATIENT
Start: 2024-04-23 | End: 2024-04-24 | Stop reason: HOSPADM

## 2024-04-23 RX ORDER — NALBUPHINE HYDROCHLORIDE 10 MG/ML
10 INJECTION, SOLUTION INTRAMUSCULAR; INTRAVENOUS; SUBCUTANEOUS EVERY 4 HOURS PRN
Status: DISCONTINUED | OUTPATIENT
Start: 2024-04-23 | End: 2024-04-23 | Stop reason: RX

## 2024-04-23 RX ORDER — DIPHENHYDRAMINE HYDROCHLORIDE 50 MG/ML
25 INJECTION INTRAMUSCULAR; INTRAVENOUS EVERY 6 HOURS PRN
Status: DISCONTINUED | OUTPATIENT
Start: 2024-04-23 | End: 2024-04-24 | Stop reason: HOSPADM

## 2024-04-23 RX ADMIN — OXYCODONE HYDROCHLORIDE 10 MG: 5 TABLET ORAL at 01:09

## 2024-04-23 RX ADMIN — IBUPROFEN 800 MG: 800 TABLET, FILM COATED ORAL at 05:47

## 2024-04-23 RX ADMIN — FAMOTIDINE 20 MG: 20 TABLET, FILM COATED ORAL at 21:31

## 2024-04-23 RX ADMIN — ACETAMINOPHEN 1000 MG: 500 TABLET ORAL at 17:22

## 2024-04-23 RX ADMIN — OXYCODONE HYDROCHLORIDE 10 MG: 5 TABLET ORAL at 04:53

## 2024-04-23 RX ADMIN — OXYCODONE HYDROCHLORIDE 10 MG: 5 TABLET ORAL at 14:34

## 2024-04-23 RX ADMIN — CITALOPRAM HYDROBROMIDE 40 MG: 20 TABLET ORAL at 08:27

## 2024-04-23 RX ADMIN — ALUMINUM HYDROXIDE, MAGNESIUM HYDROXIDE, AND DIMETHICONE 15 ML: 400; 400; 40 SUSPENSION ORAL at 01:09

## 2024-04-23 RX ADMIN — SPIRONOLACTONE 25 MG: 25 TABLET ORAL at 08:27

## 2024-04-23 RX ADMIN — OXYCODONE HYDROCHLORIDE 10 MG: 5 TABLET ORAL at 09:08

## 2024-04-23 RX ADMIN — OXYCODONE HYDROCHLORIDE 10 MG: 5 TABLET ORAL at 18:08

## 2024-04-23 RX ADMIN — TRAZODONE HYDROCHLORIDE 100 MG: 50 TABLET ORAL at 21:30

## 2024-04-23 RX ADMIN — IBUPROFEN 800 MG: 800 TABLET, FILM COATED ORAL at 14:34

## 2024-04-23 RX ADMIN — DOCUSATE SODIUM 100 MG: 100 CAPSULE, LIQUID FILLED ORAL at 08:27

## 2024-04-23 RX ADMIN — SIMETHICONE 80 MG: 80 TABLET, CHEWABLE ORAL at 08:27

## 2024-04-23 RX ADMIN — ACETAMINOPHEN 1000 MG: 500 TABLET ORAL at 09:09

## 2024-04-23 RX ADMIN — ACETAMINOPHEN 1000 MG: 500 TABLET ORAL at 01:09

## 2024-04-23 RX ADMIN — DIPHENHYDRAMINE HYDROCHLORIDE 25 MG: 50 INJECTION, SOLUTION INTRAMUSCULAR; INTRAVENOUS at 09:09

## 2024-04-23 RX ADMIN — OXYCODONE HYDROCHLORIDE 10 MG: 5 TABLET ORAL at 21:31

## 2024-04-23 RX ADMIN — DIPHENHYDRAMINE HYDROCHLORIDE 25 MG: 50 INJECTION, SOLUTION INTRAMUSCULAR; INTRAVENOUS at 14:34

## 2024-04-23 RX ADMIN — ALUMINUM HYDROXIDE, MAGNESIUM HYDROXIDE, AND DIMETHICONE 15 ML: 400; 400; 40 SUSPENSION ORAL at 14:34

## 2024-04-23 RX ADMIN — FAMOTIDINE 20 MG: 20 TABLET, FILM COATED ORAL at 08:27

## 2024-04-23 RX ADMIN — HYDROXYZINE HYDROCHLORIDE 25 MG: 25 TABLET, FILM COATED ORAL at 19:43

## 2024-04-23 RX ADMIN — DOCUSATE SODIUM 100 MG: 100 CAPSULE, LIQUID FILLED ORAL at 21:30

## 2024-04-23 RX ADMIN — ALUMINUM HYDROXIDE, MAGNESIUM HYDROXIDE, AND DIMETHICONE 15 ML: 400; 400; 40 SUSPENSION ORAL at 08:27

## 2024-04-23 RX ADMIN — IBUPROFEN 800 MG: 800 TABLET, FILM COATED ORAL at 21:31

## 2024-04-23 RX ADMIN — ALUMINUM HYDROXIDE, MAGNESIUM HYDROXIDE, AND DIMETHICONE 15 ML: 400; 400; 40 SUSPENSION ORAL at 19:43

## 2024-04-23 NOTE — SIGNIFICANT NOTE
04/23/24 0450   Urine Output   Additional Urine Volume Rows Intermittent straight cath   Bladder Scan Volume (mL) 488 mL   Intermittent/Straight Cath (mL) 600 mL   Intermittent Catheter Type Non-latex;Straight   Intermittent Catheter Size (Fr) 14   Urine Returned Yes   How Patient Tolerated Intermittent Catheterization Tolerated well     Pt called from room, reports she has voided a small amt but still feels as though her bladder is full.  Bladder scanner reveals significant post-void residual. I&O cath performed using sterile technique.  Pt reports relief.

## 2024-04-23 NOTE — PROGRESS NOTES
Patient: Priscilla Brooke  Procedure(s):  ATTEMPTED TOTAL LAPAROSCOPIC HYSTERECTOMY, CONVERTED TO OPEN ABDOMINAL HYSTERECTOMY, BILATERAL SALPINGECTOMY, SIGNIFICANT LYSIS OF ADHESIONS, DRAINAGE OF LEFT OVARIAN CYSTS, CYSTOSCOPY  Anesthesia type: General    Patient location: [facility] Surgical Floor  Last vitals: /81 (BP Location: Right arm, Patient Position: Lying)   Pulse 93   Temp 98.2 °F (36.8 °C) (Oral)   Resp 16   SpO2 96%   Level of consciousness: awake, alert, and oriented    Post-anesthesia pain: adequate analgesia  Airway patency: patent  Respiratory: unassisted  Cardiovascular: stable and blood pressure at baseline  Hydration: euvolemic    Anesthetic complications: no

## 2024-04-23 NOTE — PAYOR COMM NOTE
"TO:BC  FROM:HEIDI EUBANKS, RN PHONE [phone]  INPT NOTIFICATION AND CLINICALS  TAX ID [id number] [facility] [id number]  DATE OF ADMISSION 4/22/24  DX CODE: R10.2    Cody Brooke (43 y.o. Female)       Date of Birth   1980    Social Security Number       Address   [address] [place] [address]    Home Phone   [phone]    MRN   [id number]       Scientology   Buddhist    Marital Status                               Admission Date   4/22/24    Admission Type   Elective    Admitting Provider   Ruben Malagon MD    Attending Provider   Ruben Malagon MD    Department, Room/Bed   [facility] OB GYN, W203/1       Discharge Date       Discharge Disposition       Discharge Destination                                 Attending Provider: Ruben Malagon MD    Allergies: Latex, Adhesive Tape    Isolation: None   Infection: None   Code Status: CPR    Ht: 162.6 cm (64\")   Wt: 95.3 kg (210 lb)    Admission Cmt: None   Principal Problem: Chronic pelvic pain in female [R10.2,G89.29]                   Active Insurance as of 4/22/2024       Primary Coverage       Payor Plan Insurance Group Employer/Plan Group    [employer]HEM BLUE CROSS ANTH BLUE CROSS BLUE SHIELD PPO [id number]       Payor Plan Address Payor Plan Phone Number Payor Plan Fax Number Effective Dates    [address] [phone]  11/1/2021 - None Entered    [place] [address]         Subscriber Name Subscriber Birth Date Member ID       CODY BROOKE 1980 [id number]                     Emergency Contacts        (Rel.) Home Phone Work Phone Mobile Phone    Jair Brooke (Spouse) [phone] -- --    lylyAdriana (Daughter) [phone] -- --                 History & Physical        Ruben Malagon MD at 04/22/24 0652          GYNECOLOGY HISTORY AND PHYSICAL    CHIEF COMPLAINT: Scheduled surgery    DIAGNOSIS:  Post-ablation Tubal Sterilization Syndrome " (PATSS)  Severe pelvic pain  Previous endometrial ablation + BTL  Abnormal Pap smear: NILM, HPV +, 16/18 NEG  BMI 36    ASSESSMENT/PLAN     43 y.o.  female who presents for scheduled TLH, BS, Cysto and all other indicated procedures.    - Proceed to the OR for scheduled surgery  - Risks for the procedure reviewed  - SCDs for DVT ppx  - Antibiotics: Ancef 2g    HISTORY OF PRESENT ILLNESS     43 y.o.  female who presents for the scheduled procedure listed above.    She has no complaints today and there are no interval changes to the history.    REVIEW OF SYSTEMS: A complete review of systems was performed and was specifically negative for headache, changes in vision, RUQ pain, shortness of breath, chest pain, lower extremity edema and dysuria.     HISTORY:  Obstetrical History:  OB History    Para Term  AB Living   4 4 4     4   SAB IAB Ectopic Molar Multiple Live Births             4      # Outcome Date GA Lbr Lloyd/2nd Weight Sex Type Anes PTL Lv   4 Term      CS-LTranv   ERIK   3 Term      Vag-Spont   ERIK   2 Term      Vag-Spont   ERIK   1 Term      Vag-Spont   ERIK       Past Medical History:  Past Medical History:   Diagnosis Date    Abnormal Pap smear of cervix     Anemia     Anxiety     Body piercing     bilateral ears, left nare    Depression     Diverticulosis     Gestational diabetes mellitus 2016    History of transfusion     HPV (human papilloma virus) infection     Hyperlipidemia     Kidney stone     Multiple gestation     Ovarian cyst     Tattoos     Urinary incontinence     Wears glasses        Past Surgical History:  Past Surgical History:   Procedure Laterality Date     SECTION       SECTION WITH TUBAL  7/15/10    COLONOSCOPY      EAR TUBES      ENDOMETRIAL ABLATION      UPPER GASTROINTESTINAL ENDOSCOPY         Social History:  Social History     Tobacco Use    Smoking status: Former     Current packs/day: 0.00     Types: Cigarettes     Quit  "date: 2005     Years since quittin.9    Smokeless tobacco: Never   Vaping Use    Vaping status: Never Used   Substance Use Topics    Alcohol use: Not Currently     Comment: Occasionally    Drug use: No       Family History  Family History   Problem Relation Age of Onset    Coronary artery disease Father     Hypertension Father     Colon cancer Paternal Grandmother     Stroke Paternal Grandmother         Allergies:   Allergies   Allergen Reactions    Latex     Adhesive Tape Rash       OBJECTIVE   VITALS:   See Flowsheet    PHYSICAL EXAM:  GENERAL: NAD, alert  CHEST: No increased work of breathing, CTAB  CV: RRR, WWP  ABDOMEN: Soft, NTTP  EXTREMITIES:  Warm and well-perfused, nontender, nonedematous  NEURO: AAO x 3, CN II-XII grossly intact    No current facility-administered medications on file prior to encounter.     Current Outpatient Medications on File Prior to Encounter   Medication Sig Dispense Refill    citalopram (CeleXA) 40 MG tablet Take 1 tablet by mouth Daily. 90 tablet 1    cyanocobalamin 1000 MCG/ML injection Inject 1 mL into the appropriate muscle as directed by prescriber Every 28 (Twenty-Eight) Days. 3 mL 3    hydrocortisone (ANUSOL-HC) 2.5 % rectal cream Insert  into the rectum 2 (Two) Times a Day. 30 g 1    naproxen (NAPROSYN) 500 MG tablet Take 1 tablet by mouth 2 (Two) Times a Day With Meals. 270 tablet 1    spironolactone (ALDACTONE) 25 MG tablet Take 1 tablet by mouth Daily.      Tirzepatide (MOUNJARO) 2.5 MG/0.5ML solution pen-injector pen Inject 0.5 mL under the skin into the appropriate area as directed 1 (One) Time Per Week.      traZODone (DESYREL) 50 MG tablet Take 1 tablet by mouth every night at bedtime. (Patient taking differently: Take 2 tablets by mouth every night at bedtime.) 90 tablet 1       DIAGNOSTIC STUDIES:        Invalid input(s): \"LABALB\", \"UA\"    No results found for this or any previous visit (from the past 24 hour(s)).    Ruben Malagon MD  Obstetrics and " Gynecology  [facility]       Electronically signed by Ruben Malagon MD at 04/22/24 0656       Vital Signs (last day)       Date/Time Temp Temp src Pulse Resp BP Patient Position SpO2    04/23/24 0551 98.2 (36.8) Oral 93 16 128/81 Lying 96    04/23/24 0117 98.3 (36.8) Oral 94 16 111/71 Lying 94    04/22/24 2110 98.2 (36.8) Oral 97 18 108/76 Lying 94    04/22/24 1732 98 (36.7) Oral 80 16 114/78 Lying 98    04/22/24 1609 97.9 (36.6) Oral 85 17 108/69 Lying 98    04/22/24 1430 -- -- 80 15 101/71 Lying 92    04/22/24 1400 -- -- 84 16 95/64 Lying 95    04/22/24 1330 -- -- 93 16 103/68 Lying 94    04/22/24 1300 97.7 (36.5) Oral 105 15 116/79 Lying 99    04/22/24 1240 98.2 (36.8) Temporal 87 15 106/58 Lying 95    04/22/24 1230 -- -- 105 15 109/65 Lying 96    04/22/24 1220 -- -- 90 15 105/60 Lying 95    04/22/24 1210 -- -- 105 14 113/70 Lying 96    04/22/24 1200 -- -- 112 15 120/76 Lying 91    04/22/24 1153 -- -- 120 -- -- -- 97    04/22/24 1150 -- -- 105 15 130/76 Lying 98    04/22/24 1145 -- -- 114 13 130/73 Lying 96    04/22/24 1140 -- -- 99 14 115/81 Lying 95    04/22/24 1139 97.9 (36.6) Temporal 105 14 123/70 Lying 95    04/22/24 0657 97.4 (36.3) Tympanic 92 16 118/75 Lying 99          Current Facility-Administered Medications   Medication Dose Route Frequency Provider Last Rate Last Admin    acetaminophen (TYLENOL) tablet 1,000 mg  1,000 mg Oral Q8H Ruben Malagon MD   1,000 mg at 04/23/24 0109    aluminum-magnesium hydroxide-simethicone (MAALOX MAX) 400-400-40 MG/5ML suspension 15 mL  15 mL Oral Q6H Ruben Malagon MD   15 mL at 04/23/24 0827    calcium carbonate (TUMS) chewable tablet 500 mg (200 mg elemental)  1 tablet Oral TID PRN Ruben Malagon MD        citalopram (CeleXA) tablet 40 mg  40 mg Oral Daily Rubne Malagon MD   40 mg at 04/23/24 0827    dextrose 5 % and sodium chloride 0.45 % infusion  75 mL/hr Intravenous Continuous Ruben Malagon MD 75 mL/hr at  04/22/24 1312 75 mL/hr at 04/22/24 1312    docusate sodium (COLACE) capsule 100 mg  100 mg Oral BID Ruben Malagon MD   100 mg at 04/23/24 0827    famotidine (PEPCID) tablet 20 mg  20 mg Oral BID Ruben Malagon MD   20 mg at 04/23/24 0827    HYDROmorphone (DILAUDID) injection 0.5 mg  0.5 mg Intravenous Q2H PRN Ruben Malagon MD   0.5 mg at 04/22/24 2027    And    naloxone (NARCAN) injection 0.1 mg  0.1 mg Intravenous Q5 Min PRN Ruben Malagon MD        ibuprofen (ADVIL,MOTRIN) tablet 800 mg  800 mg Oral Q8H Ruben Malagon MD   800 mg at 04/23/24 0547    Insulin Lispro (humaLOG) injection 2-5 Units  2-5 Units Subcutaneous TID PC Ruben Malagon MD   6 Units at 04/22/24 1805    ondansetron ODT (ZOFRAN-ODT) disintegrating tablet 4 mg  4 mg Oral Q6H PRN Ruben Malagon MD        Or    ondansetron (ZOFRAN) injection 4 mg  4 mg Intravenous Q6H PRRuben Lopez MD        oxyCODONE (ROXICODONE) immediate release tablet 5 mg  5 mg Oral Q4H PRRuben Lopez MD        Or    oxyCODONE (ROXICODONE) immediate release tablet 10 mg  10 mg Oral Q4H PRRuben Lopez MD   10 mg at 04/23/24 0453    promethazine (PHENERGAN) suppository 12.5 mg  12.5 mg Rectal Q6H PRRuben Lopez MD        Or    promethazine (PHENERGAN) tablet 12.5 mg  12.5 mg Oral Q6H PRRuben Lopez MD        simethicone (MYLICON) chewable tablet 80 mg  80 mg Oral 4x Daily PRN Ruben Malagon MD   80 mg at 04/23/24 0827    spironolactone (ALDACTONE) tablet 25 mg  25 mg Oral Daily Ruben Malagon MD   25 mg at 04/23/24 0827    traZODone (DESYREL) tablet 100 mg  100 mg Oral Nightly Ruben Malagon MD   100 mg at 04/22/24 2141     Lab Results (last 24 hours)       Procedure Component Value Units Date/Time    POC Glucose 4x Daily Fasting & PC [[id number]]  (Normal) Collected: 04/23/24 0717    Specimen: Blood Updated: 04/23/24 0722     Glucose 102 mg/dL      Comment: Serial  Number: [id number]:  [id number]       POC Glucose Once [[id number]]  (Normal) Collected: 04/22/24 2149    Specimen: Blood Updated: 04/22/24 2151     Glucose 71 mg/dL      Comment: Serial Number: [id number]:  [id number]       POC Glucose 4x Daily Fasting & PC [[id number]]  (Abnormal) Collected: 04/22/24 1915    Specimen: Blood Updated: 04/22/24 1933     Glucose 191 mg/dL      Comment: Serial Number: [id number]:  [id number]       POC Glucose Once [[id number]]  (Abnormal) Collected: 04/22/24 1735    Specimen: Blood Updated: 04/22/24 1743     Glucose 281 mg/dL      Comment: Serial Number: [id number]:  [id number]       POC Glucose Once [[id number]]  (Abnormal) Collected: 04/22/24 1734    Specimen: Blood Updated: 04/22/24 1743     Glucose 217 mg/dL      Comment: Serial Number: [id number]:  [id number]       TISSUE EXAM, [facility] (LEONIDAS,COR,[facility]AD) [[id number]] Collected: 04/22/24 0844    Specimen: Tissue from Uterus, Cervix, Bilateral Fallopian Tubes  Updated: 04/22/24 1330          Imaging Results (Last 24 Hours)       ** No results found for the last 24 hours. **          Operative/Procedure Notes (last 24 hours)  Notes from 04/22/24 0828 through 04/23/24 0828   No notes of this type exist for this encounter.       Physician Progress Notes (last 24 hours)  Notes from 04/22/24 0828 through 04/23/24 0828   No notes of this type exist for this encounter.

## 2024-04-23 NOTE — PLAN OF CARE
Problem: Adult Inpatient Plan of Care  Goal: Plan of Care Review  Outcome: Ongoing, Progressing  Goal: Patient-Specific Goal (Individualized)  Outcome: Ongoing, Progressing  Goal: Absence of Hospital-Acquired Illness or Injury  Outcome: Ongoing, Progressing  Intervention: Identify and Manage Fall Risk  Recent Flowsheet Documentation  Taken 4/23/2024 0400 by Jael Macdonald RN  Safety Promotion/Fall Prevention: safety round/check completed  Taken 4/23/2024 0200 by Jael Macdonald RN  Safety Promotion/Fall Prevention: safety round/check completed  Taken 4/23/2024 0000 by Jael Macdonald RN  Safety Promotion/Fall Prevention: safety round/check completed  Taken 4/22/2024 2200 by Jael Macdonald RN  Safety Promotion/Fall Prevention: safety round/check completed  Taken 4/22/2024 2000 by Jael Macdonald RN  Safety Promotion/Fall Prevention:   safety round/check completed   nonskid shoes/slippers when out of bed   clutter free environment maintained   assistive device/personal items within reach   room organization consistent  Intervention: Prevent Skin Injury  Recent Flowsheet Documentation  Taken 4/22/2024 2000 by Jael Macdonald RN  Body Position: position changed independently  Intervention: Prevent and Manage VTE (Venous Thromboembolism) Risk  Recent Flowsheet Documentation  Taken 4/22/2024 2000 by Jael Macdonald RN  VTE Prevention/Management:   bilateral   sequential compression devices on  Intervention: Prevent Infection  Recent Flowsheet Documentation  Taken 4/22/2024 2000 by Jael Macdonald RN  Infection Prevention:   visitors restricted/screened   single patient room provided   rest/sleep promoted   personal protective equipment utilized   hand hygiene promoted   equipment surfaces disinfected   environmental surveillance performed  Goal: Optimal Comfort and Wellbeing  Outcome: Ongoing, Progressing  Intervention: Monitor Pain and Promote Comfort  Recent Flowsheet Documentation  Taken 4/23/2024 0109 by  Torres, Jael L, RN  Pain Management Interventions: see MAR  Taken 4/22/2024 2027 by Jael Macdonald RN  Pain Management Interventions: see MAR  Intervention: Provide Person-Centered Care  Recent Flowsheet Documentation  Taken 4/22/2024 2000 by Jael Macdonald RN  Trust Relationship/Rapport:   care explained   choices provided   empathic listening provided   questions encouraged   thoughts/feelings acknowledged  Goal: Readiness for Transition of Care  Outcome: Ongoing, Progressing     Problem: Bleeding (Hysterectomy)  Goal: Absence of Bleeding  Outcome: Ongoing, Progressing     Problem: Bowel Motility Impaired (Hysterectomy)  Goal: Effective Bowel Elimination  Outcome: Ongoing, Progressing     Problem: Fluid and Electrolyte Imbalance (Hysterectomy)  Goal: Fluid and Electrolyte Balance  Outcome: Ongoing, Progressing     Problem: Infection (Hysterectomy)  Goal: Absence of Infection Signs and Symptoms  Outcome: Ongoing, Progressing     Problem: Ongoing Anesthesia Effects (Hysterectomy)  Goal: Anesthesia/Sedation Recovery  Outcome: Ongoing, Progressing  Intervention: Optimize Anesthesia Recovery  Recent Flowsheet Documentation  Taken 4/23/2024 0400 by Jael Macdonald RN  Safety Promotion/Fall Prevention: safety round/check completed  Taken 4/23/2024 0200 by Jael Macdonald RN  Safety Promotion/Fall Prevention: safety round/check completed  Taken 4/23/2024 0000 by Jael Macdonald RN  Safety Promotion/Fall Prevention: safety round/check completed  Taken 4/22/2024 2200 by Jael Macdonald RN  Safety Promotion/Fall Prevention: safety round/check completed  Taken 4/22/2024 2000 by Jael Macdonald RN  Safety Promotion/Fall Prevention:   safety round/check completed   nonskid shoes/slippers when out of bed   clutter free environment maintained   assistive device/personal items within reach   room organization consistent     Problem: Pain (Hysterectomy)  Goal: Acceptable Pain Control  Outcome: Ongoing,  Progressing  Intervention: Prevent or Manage Pain  Recent Flowsheet Documentation  Taken 4/23/2024 0109 by Jael Macdonald, RN  Pain Management Interventions: see MAR  Taken 4/22/2024 2027 by Jael Macdonald, RN  Pain Management Interventions: see MAR     Problem: Postoperative Nausea and Vomiting (Hysterectomy)  Goal: Nausea and Vomiting Relief  Outcome: Ongoing, Progressing     Problem: Postoperative Urinary Retention (Hysterectomy)  Goal: Effective Urinary Elimination  Outcome: Ongoing, Progressing     Problem: Respiratory Compromise (Hysterectomy)  Goal: Effective Oxygenation and Ventilation  Outcome: Ongoing, Progressing   Goal Outcome Evaluation:

## 2024-04-24 VITALS
SYSTOLIC BLOOD PRESSURE: 123 MMHG | RESPIRATION RATE: 16 BRPM | DIASTOLIC BLOOD PRESSURE: 77 MMHG | OXYGEN SATURATION: 94 % | HEART RATE: 89 BPM | TEMPERATURE: 98 F

## 2024-04-24 PROBLEM — R10.2 PELVIC PAIN SYNDROME: Status: RESOLVED | Noted: 2024-04-23 | Resolved: 2024-04-24

## 2024-04-24 LAB
BASOPHILS # BLD AUTO: 0.04 10*3/MM3 (ref 0–0.2)
BASOPHILS NFR BLD AUTO: 0.4 % (ref 0–1.5)
DEPRECATED RDW RBC AUTO: 44.9 FL (ref 37–54)
EOSINOPHIL # BLD AUTO: 0.11 10*3/MM3 (ref 0–0.4)
EOSINOPHIL NFR BLD AUTO: 1 % (ref 0.3–6.2)
ERYTHROCYTE [DISTWIDTH] IN BLOOD BY AUTOMATED COUNT: 13.5 % (ref 12.3–15.4)
GLUCOSE BLDC GLUCOMTR-MCNC: 78 MG/DL (ref 70–130)
GLUCOSE BLDC GLUCOMTR-MCNC: 83 MG/DL (ref 70–130)
GLUCOSE BLDC GLUCOMTR-MCNC: 88 MG/DL (ref 70–130)
GLUCOSE BLDC GLUCOMTR-MCNC: 95 MG/DL (ref 70–130)
HCT VFR BLD AUTO: 28.1 % (ref 34–46.6)
HGB BLD-MCNC: 9.3 G/DL (ref 12–15.9)
IMM GRANULOCYTES # BLD AUTO: 0.04 10*3/MM3 (ref 0–0.05)
IMM GRANULOCYTES NFR BLD AUTO: 0.4 % (ref 0–0.5)
LYMPHOCYTES # BLD AUTO: 3.82 10*3/MM3 (ref 0.7–3.1)
LYMPHOCYTES NFR BLD AUTO: 34 % (ref 19.6–45.3)
MCH RBC QN AUTO: 29.8 PG (ref 26.6–33)
MCHC RBC AUTO-ENTMCNC: 33.1 G/DL (ref 31.5–35.7)
MCV RBC AUTO: 90.1 FL (ref 79–97)
MONOCYTES # BLD AUTO: 0.59 10*3/MM3 (ref 0.1–0.9)
MONOCYTES NFR BLD AUTO: 5.2 % (ref 5–12)
NEUTROPHILS NFR BLD AUTO: 59 % (ref 42.7–76)
NEUTROPHILS NFR BLD AUTO: 6.65 10*3/MM3 (ref 1.7–7)
NRBC BLD AUTO-RTO: 0 /100 WBC (ref 0–0.2)
PLATELET # BLD AUTO: 236 10*3/MM3 (ref 140–450)
PMV BLD AUTO: 10.3 FL (ref 6–12)
RBC # BLD AUTO: 3.12 10*6/MM3 (ref 3.77–5.28)
REF LAB TEST METHOD: NORMAL
WBC NRBC COR # BLD AUTO: 11.25 10*3/MM3 (ref 3.4–10.8)

## 2024-04-24 PROCEDURE — 99024 POSTOP FOLLOW-UP VISIT: CPT | Performed by: OBSTETRICS & GYNECOLOGY

## 2024-04-24 PROCEDURE — 82948 REAGENT STRIP/BLOOD GLUCOSE: CPT | Performed by: OBSTETRICS & GYNECOLOGY

## 2024-04-24 PROCEDURE — 85025 COMPLETE CBC W/AUTO DIFF WBC: CPT | Performed by: OBSTETRICS & GYNECOLOGY

## 2024-04-24 PROCEDURE — 82948 REAGENT STRIP/BLOOD GLUCOSE: CPT

## 2024-04-24 RX ORDER — OXYCODONE HYDROCHLORIDE 10 MG/1
10 TABLET ORAL EVERY 6 HOURS PRN
Qty: 10 TABLET | Refills: 0 | Status: SHIPPED | OUTPATIENT
Start: 2024-04-24

## 2024-04-24 RX ORDER — ACETAMINOPHEN 500 MG
1000 TABLET ORAL EVERY 8 HOURS PRN
Qty: 60 TABLET | Refills: 0 | Status: SHIPPED | OUTPATIENT
Start: 2024-04-24 | End: 2025-04-24

## 2024-04-24 RX ORDER — IBUPROFEN 800 MG/1
800 TABLET ORAL EVERY 8 HOURS PRN
Qty: 30 TABLET | Refills: 0 | Status: SHIPPED | OUTPATIENT
Start: 2024-04-24

## 2024-04-24 RX ORDER — FERROUS SULFATE 325(65) MG
325 TABLET ORAL
Qty: 60 TABLET | Refills: 6 | Status: SHIPPED | OUTPATIENT
Start: 2024-04-24

## 2024-04-24 RX ADMIN — ALUMINUM HYDROXIDE, MAGNESIUM HYDROXIDE, AND DIMETHICONE 15 ML: 400; 400; 40 SUSPENSION ORAL at 08:06

## 2024-04-24 RX ADMIN — DOCUSATE SODIUM 100 MG: 100 CAPSULE, LIQUID FILLED ORAL at 08:05

## 2024-04-24 RX ADMIN — ALUMINUM HYDROXIDE, MAGNESIUM HYDROXIDE, AND DIMETHICONE 15 ML: 400; 400; 40 SUSPENSION ORAL at 14:07

## 2024-04-24 RX ADMIN — SPIRONOLACTONE 25 MG: 25 TABLET ORAL at 08:05

## 2024-04-24 RX ADMIN — ACETAMINOPHEN 1000 MG: 500 TABLET ORAL at 01:55

## 2024-04-24 RX ADMIN — FAMOTIDINE 20 MG: 20 TABLET, FILM COATED ORAL at 08:05

## 2024-04-24 RX ADMIN — OXYCODONE HYDROCHLORIDE 10 MG: 5 TABLET ORAL at 08:04

## 2024-04-24 RX ADMIN — ALUMINUM HYDROXIDE, MAGNESIUM HYDROXIDE, AND DIMETHICONE 15 ML: 400; 400; 40 SUSPENSION ORAL at 01:55

## 2024-04-24 RX ADMIN — SIMETHICONE 80 MG: 80 TABLET, CHEWABLE ORAL at 14:07

## 2024-04-24 RX ADMIN — IBUPROFEN 800 MG: 800 TABLET, FILM COATED ORAL at 14:07

## 2024-04-24 RX ADMIN — ACETAMINOPHEN 1000 MG: 500 TABLET ORAL at 10:22

## 2024-04-24 RX ADMIN — IBUPROFEN 800 MG: 800 TABLET, FILM COATED ORAL at 06:12

## 2024-04-24 RX ADMIN — CITALOPRAM HYDROBROMIDE 40 MG: 20 TABLET ORAL at 08:05

## 2024-04-24 NOTE — DISCHARGE SUMMARY
GYNECOLOGY DISCHARGE SUMMARY    Admission Date: 2024    Discharge Date:  2024     Discharge Diagnosis:   POD#2 s/p JACK, BS, JENNIFER  Post-ablation Tubal Sterilization Syndrome (PATSS)  Severe pelvic pain  Previous endometrial ablation + BTL  Significant pelvic adhesive disease  Abnormal Pap smear: NILM, HPV +, 16/18 NEG  BMI 36    Procedures/Delivery information:  2024  Laparoscopic converted to total abdominal hysterectomy  Bilateral salpingectomy  Significant lysis of adhesions  Drainage of left ovarian cysts (2)  Cystoscopy     Consults:  None    Pertinent Labs/Immaging:  Hemoglobin: 12.6 --> 9.3  Creatinine: 0.93 --> 0.81    Hospital Summary  Priscilla Brooke is a 43 y.o.  who was admitted for scheduled surgery. She underwent the procedures outlined above without complication.  Please see the operative note for additional details.     Postoperatively, she was transferred to the floor for ongoing care.  She was tolerating a regular diet on POD#0.  Her pain was well controlled with scheduled Tylenol and Toradol and Roxicodone when necessary.  She was ambulating on POD#1.  Her Mohamud catheter was removed on POD#0 and she passed a passive voiding trial without issue.  She passed flatus on POD#2.  Her exam was reassuring throughout admission and her incision(s) were well-healing.  She was deemed stable for discharge home on POD#2.  Operative findings, discharge instructions and precautions were reviewed with the patient and all questions were answered.    Discharge Medications:     Discharge Medications        New Medications        Instructions Start Date   acetaminophen 500 MG tablet  Commonly known as: TYLENOL   1,000 mg, Oral, Every 8 Hours PRN      ferrous sulfate 325 (65 FE) MG tablet   325 mg, Oral, 2 Times Daily Before Meals      ibuprofen 800 MG tablet  Commonly known as: ADVIL,MOTRIN   800 mg, Oral, Every 8 Hours PRN      oxyCODONE 10 MG tablet  Commonly known as: ROXICODONE   10 mg,  Oral, Every 6 Hours PRN             Continue These Medications        Instructions Start Date   citalopram 40 MG tablet  Commonly known as: CeleXA   40 mg, Oral, Daily      cyanocobalamin 1000 MCG/ML injection   1,000 mcg, Intramuscular, Every 28 Days      naproxen 500 MG tablet  Commonly known as: Naprosyn   500 mg, Oral, 2 Times Daily With Meals      Proctozone-HC 2.5 % rectal cream  Generic drug: Hydrocortisone (Perianal)   Rectal, 2 Times Daily      spironolactone 25 MG tablet  Commonly known as: ALDACTONE   25 mg, Oral, Daily      Tirzepatide 2.5 MG/0.5ML solution pen-injector pen  Commonly known as: MOUNJARO   2.5 mg, Subcutaneous, Weekly      traZODone 50 MG tablet  Commonly known as: DESYREL   50 mg, Oral, Every Night at Bedtime               Physical Exam on D/C  Gen: NAD  CV: RRR  Pulm: resps unlabored  Abd: soft, minimally tender, non-distended  Incision(s): Well-healing, suture, no erythema, no drainage  Ext: wwp, non-tender, SCDs in place    Follow up:  1 weeks in our office    Time spent on discharge: 15 minutes    Ruben Malagon MD  Obstetrics and Gynecology  [facility]

## 2024-04-24 NOTE — DISCHARGE INSTRUCTIONS
Pelvic Surgery  Home Care Instructions:  Dr. Ruben Malagon      Thank you for choosing [facility]. Please let us know if you have questions or concerns or do not understand the information we give you. Always ask us to explain words or phrases you do not understand.    You have had pelvic surgery. Here are some basic guidelines to help you recover more quickly at home. Your doctor may give you more guidelines. If you have any questions after you go home, please call the numbers listed on the next page.    General Guidelines    1. Do not lift anything over 10 pounds for about six weeks. This includes pushing or pulling heavy objects.  2. Do not put anything in the vagina (no tampons or douching).    3.   Do not have sex until cleared by your physician.  4.   You may climb steps.  5. You may bathe or shower.  6. The only exercise you should do is walking. This is important for your recovery.  7. Do not drive while taking narcotics.  8. Take the medicines you were taking before surgery. Other medicines you need to take at home are:    Alternate Motrin and Tylenol around the clock. Take each every 8 hours in alternation so that you are getting one of the medications every 4 hours.   Roxicodone 10 mg tab  - One tablet by mouth every 4-6 hours as needed for breakthrough pain   Metamucil + Colace daily to keep bowel movements soft   Iron for anemia     If you have questions about your medicines, let us know. Or, talk to your local pharmacist.    Surgery, lack of activity, pain medicines and iron pills tend to cause constipation. Take something every day to prevent constipation and straining.  Taking something like Metamucil® every day to increase fiber may prevent constipation. Follow the instructions on the container. If you need a gentle laxative, then something like Milk of Magnesia® or Correctol® work fairly well. You should not need to take laxatives often.    10. Call your doctor if you have:      a. Fever of 101 degrees or higher.  b. Heavy vaginal bleeding.  c. Increased redness around your incision (cut); incision pulling apart; drainage (pus), bleeding, or a large amount of fluid from your incision.  d. Worsening nausea or pain.  e. Other problems or concern.    If you have any questions or concerns about your usual routines, please talk to the nurse or doctor.       To talk with your doctor at [facility], call:  Obstetrics and Gynecology Outpatient Clinic  Phone: [phone]      We appreciate the opportunity you have given us to participate in your care.

## 2024-04-24 NOTE — PROGRESS NOTES
Subjective     Subjective  Patient reports:  Pain is well controlled.  She is  ambulating. Tolerating normal diet. Voiding -- without difficulty. She has not passed gas yet. Has been ambulating.      Objective     Objective  Physical Exam:  Incision: no drainage   Lungs: Respirations even and unlabored .   Abdomen: abdomen is soft without significant tenderness, good bowel sounds throughout   Extremities:  No edema.           Vital Sign Min/Max    Temp  Min: 98.2 °F (36.8 °C)  Max: 98.4 °F (36.9 °C)   Pulse  Min: 80  Max: 92   Resp  Min: 16  Max: 18   BP  Min: 96/67  Max: 110/74   No data recorded   SpO2  Min: 92 %  Max: 98 %   No data recorded         Intake/Output last 24 hours:    Intake/Output Summary (Last 24 hours) at 4/24/2024 0856  Last data filed at 4/24/2024 0600  Gross per 24 hour   Intake 360 ml   Output 1221 ml   Net -861 ml       Intake/Output this shift:  No intake/output data recorded.    Labs/Studies reviewed:  Yes   Radiology studies reviewed:   No   Medications reviewed:   Yes     Assessment & Plan             Chronic pelvic pain in female    S/P total abdominal hysterectomy    Pelvic pain syndrome      Post op day 2 Procedure(s):  ATTEMPTED TOTAL LAPAROSCOPIC HYSTERECTOMY, CONVERTED TO OPEN ABDOMINAL HYSTERECTOMY, BILATERAL SALPINGECTOMY, SIGNIFICANT LYSIS OF ADHESIONS, DRAINAGE OF LEFT OVARIAN CYSTS, CYSTOSCOPY Doing well postoperatively..    Plan:  continue post op care , do anticipate discharge later today after passing gas.              Carla Moore PA-C  04/24/24  08:56 EDT

## 2024-04-24 NOTE — PLAN OF CARE
Goal Outcome Evaluation:           Progress: improving  Outcome Evaluation: VSS, adequate pain relief, incision clean dry and intact,ambulating and voiding adequately,anticipating discharge

## 2024-04-24 NOTE — PLAN OF CARE
Goal Outcome Evaluation:      VSS, adequate intake and output, pain well controlled with current medication regimen, incision healing well, pt ambulating and passing platus, plan to discharge home today

## 2024-04-25 NOTE — PAYOR COMM NOTE
"    D/C SUMMARY  UR MANAGER; DEMAR GORDON, [phone] AND [phone]      Cody Brooke (43 y.o. Female)       Date of Birth   1980    Social Security Number       Address   [address] [place] [address]    Home Phone   [phone]    MRN   [id number]       Religious   Tenriism    Marital Status                               Admission Date   4/22/24    Admission Type   Elective    Admitting Provider   Ruben Malagon MD    Attending Provider       Department, Room/Bed   [facility] OB GYN, W203/1       Discharge Date   4/24/2024    Discharge Disposition   Home or Self Care    Discharge Destination                                 Attending Provider: (none)   Allergies: Latex, Adhesive Tape    Isolation: None   Infection: None   Code Status: Prior    Ht: 162.6 cm (64\")   Wt: 95.3 kg (210 lb)    Admission Cmt: None   Principal Problem: Chronic pelvic pain in female [R10.2,G89.29]                   Active Insurance as of 4/22/2024       Primary Coverage       Payor Plan Insurance Group Employer/Plan Group    [employer]HE[employer] BLUE CROSS ANTHEM BLUE CROSS BLUE SHIELD PPO [id number]       Payor Plan Address Payor Plan Phone Number Payor Plan Fax Number Effective Dates    [address] [phone]  11/1/2021 - None Entered    [place] [address]         Subscriber Name Subscriber Birth Date Member ID       CODY BROOKE 1980 [id number]                     Emergency Contacts        (Rel.) Home Phone Work Phone Mobile Phone    Jair Brooke (Spouse) [phone] -- --    Adriana desouza (Daughter) [phone] -- --                 Physician Progress Notes (last 24 hours)        Carla Moore PA-C at 04/24/24 0856       Attestation signed by Ruben Malagon MD at 04/24/24 1010    I agree with the assessment and plan.    Ruben Malagon MD  Obstetrics and Gynecology  [facility]                      Subjective "     Subjective  Patient reports:  Pain is well controlled.  She is  ambulating. Tolerating normal diet. Voiding -- without difficulty. She has not passed gas yet. Has been ambulating.      Objective     Objective  Physical Exam:  Incision: no drainage   Lungs: Respirations even and unlabored .   Abdomen: abdomen is soft without significant tenderness, good bowel sounds throughout   Extremities:  No edema.           Vital Sign Min/Max    Temp  Min: 98.2 °F (36.8 °C)  Max: 98.4 °F (36.9 °C)   Pulse  Min: 80  Max: 92   Resp  Min: 16  Max: 18   BP  Min: 96/67  Max: 110/74   No data recorded   SpO2  Min: 92 %  Max: 98 %   No data recorded         Intake/Output last 24 hours:    Intake/Output Summary (Last 24 hours) at 4/24/2024 0856  Last data filed at 4/24/2024 0600  Gross per 24 hour   Intake 360 ml   Output 1221 ml   Net -861 ml       Intake/Output this shift:  No intake/output data recorded.    Labs/Studies reviewed:  Yes   Radiology studies reviewed:   No   Medications reviewed:   Yes     Assessment & Plan             Chronic pelvic pain in female    S/P total abdominal hysterectomy    Pelvic pain syndrome      Post op day 2 Procedure(s):  ATTEMPTED TOTAL LAPAROSCOPIC HYSTERECTOMY, CONVERTED TO OPEN ABDOMINAL HYSTERECTOMY, BILATERAL SALPINGECTOMY, SIGNIFICANT LYSIS OF ADHESIONS, DRAINAGE OF LEFT OVARIAN CYSTS, CYSTOSCOPY Doing well postoperatively..    Plan:  continue post op care , do anticipate discharge later today after passing gas.             Carla Moore PA-C  04/24/24  08:56 EDT              Electronically signed by Ruben Malagon MD at 04/24/24 1010       Discharge Summary    No notes of this type exist for this encounter.

## 2024-04-25 NOTE — PAYOR COMM NOTE
"    D/C SUMMARY  UR MANAGER; DEMAR GORDON, [phone] AND [phone]    Cody Brooke (43 y.o. Female)       Date of Birth   1980    Social Security Number       Address   [address] [place] [address]    Home Phone   [phone]    MRN   [id number]       Mu-ism   Hoahaoism    Marital Status                               Admission Date   4/22/24    Admission Type   Elective    Admitting Provider   Ruben Malagon MD    Attending Provider       Department, Room/Bed   [facility] OB GYN, W203/1       Discharge Date   4/24/2024    Discharge Disposition   Home or Self Care    Discharge Destination                                 Attending Provider: (none)   Allergies: Latex, Adhesive Tape    Isolation: None   Infection: None   Code Status: Prior    Ht: 162.6 cm (64\")   Wt: 95.3 kg (210 lb)    Admission Cmt: None   Principal Problem: Chronic pelvic pain in female [R10.2,G89.29]                   Active Insurance as of 4/22/2024       Primary Coverage       Payor Plan Insurance Group Employer/Plan Group    [employer]HEM BLUE CROSS ANTHEM BLUE CROSS BLUE SHIELD PPO [id number]       Payor Plan Address Payor Plan Phone Number Payor Plan Fax Number Effective Dates    [address] [phone]  11/1/2021 - None Entered    [place] [address]         Subscriber Name Subscriber Birth Date Member ID       CODY BROOKE 1980 [id number]                     Emergency Contacts        (Rel.) Home Phone Work Phone Mobile Phone    Jair Brooke (Spouse) [phone] -- --    Adriana desouza (Daughter) [phone] -- --              Physician Progress Notes (last 24 hours)  Notes from 04/24/24 0941 through 04/25/24 0941   No notes of this type exist for this encounter.          Discharge Summary        Ruben Malagon MD at 04/24/24 1506          GYNECOLOGY DISCHARGE SUMMARY    Admission Date: 4/22/2024    Discharge Date:  4/24/2024   "   Discharge Diagnosis:   POD#2 s/p JACK, BS, JENNIFER  Post-ablation Tubal Sterilization Syndrome (PATSS)  Severe pelvic pain  Previous endometrial ablation + BTL  Significant pelvic adhesive disease  Abnormal Pap smear: NILM, HPV +, 16/18 NEG  BMI 36    Procedures/Delivery information:  2024  Laparoscopic converted to total abdominal hysterectomy  Bilateral salpingectomy  Significant lysis of adhesions  Drainage of left ovarian cysts (2)  Cystoscopy     Consults:  None    Pertinent Labs/Immaging:  Hemoglobin: 12.6 --> 9.3  Creatinine: 0.93 --> 0.81    Hospital Summary  Priscilla Brooke is a 43 y.o.  who was admitted for scheduled surgery. She underwent the procedures outlined above without complication.  Please see the operative note for additional details.     Postoperatively, she was transferred to the floor for ongoing care.  She was tolerating a regular diet on POD#0.  Her pain was well controlled with scheduled Tylenol and Toradol and Roxicodone when necessary.  She was ambulating on POD#1.  Her Mohamud catheter was removed on POD#0 and she passed a passive voiding trial without issue.  She passed flatus on POD#2.  Her exam was reassuring throughout admission and her incision(s) were well-healing.  She was deemed stable for discharge home on POD#2.  Operative findings, discharge instructions and precautions were reviewed with the patient and all questions were answered.    Discharge Medications:     Discharge Medications        New Medications        Instructions Start Date   acetaminophen 500 MG tablet  Commonly known as: TYLENOL   1,000 mg, Oral, Every 8 Hours PRN      ferrous sulfate 325 (65 FE) MG tablet   325 mg, Oral, 2 Times Daily Before Meals      ibuprofen 800 MG tablet  Commonly known as: ADVIL,MOTRIN   800 mg, Oral, Every 8 Hours PRN      oxyCODONE 10 MG tablet  Commonly known as: ROXICODONE   10 mg, Oral, Every 6 Hours PRN             Continue These Medications        Instructions Start  Date   citalopram 40 MG tablet  Commonly known as: CeleXA   40 mg, Oral, Daily      cyanocobalamin 1000 MCG/ML injection   1,000 mcg, Intramuscular, Every 28 Days      naproxen 500 MG tablet  Commonly known as: Naprosyn   500 mg, Oral, 2 Times Daily With Meals      Proctozone-HC 2.5 % rectal cream  Generic drug: Hydrocortisone (Perianal)   Rectal, 2 Times Daily      spironolactone 25 MG tablet  Commonly known as: ALDACTONE   25 mg, Oral, Daily      Tirzepatide 2.5 MG/0.5ML solution pen-injector pen  Commonly known as: MOUNJARO   2.5 mg, Subcutaneous, Weekly      traZODone 50 MG tablet  Commonly known as: DESYREL   50 mg, Oral, Every Night at Bedtime               Physical Exam on D/C  Gen: NAD  CV: RRR  Pulm: resps unlabored  Abd: soft, minimally tender, non-distended  Incision(s): Well-healing, suture, no erythema, no drainage  Ext: wwp, non-tender, SCDs in place    Follow up:  1 weeks in our office    Time spent on discharge: 15 minutes    Ruben Malagon MD  Obstetrics and Gynecology  [facility]       Electronically signed by Ruben Malagon MD at 04/25/24 5235

## 2024-05-01 ENCOUNTER — OFFICE VISIT (OUTPATIENT)
Dept: OBSTETRICS AND GYNECOLOGY | Facility: CLINIC | Age: 44
End: 2024-05-01
Payer: COMMERCIAL

## 2024-05-01 VITALS
HEIGHT: 64 IN | BODY MASS INDEX: 34.66 KG/M2 | WEIGHT: 203 LBS | SYSTOLIC BLOOD PRESSURE: 120 MMHG | DIASTOLIC BLOOD PRESSURE: 74 MMHG

## 2024-05-01 DIAGNOSIS — Z90.710 S/P TOTAL ABDOMINAL HYSTERECTOMY: Primary | ICD-10-CM

## 2024-05-01 PROCEDURE — 99024 POSTOP FOLLOW-UP VISIT: CPT | Performed by: OBSTETRICS & GYNECOLOGY

## 2024-05-03 NOTE — PROGRESS NOTES
"Postoperative Assessment Visit    Subjective   Chief Complaint   Patient presents with    Post-op     9 days JACK-BS, JENNIFER, drainage of left ovarian cyst. Cramping, constipation       43 y.o.  female who presents for a post-op visit.    Pre-op Diagnosis:  Post-ablation Tubal Sterilization Syndrome (PATSS)  Severe pelvic pain  Previous endometrial ablation + BTL  Abnormal Pap smear: NILM, HPV +, 16/18 NEG  BMI 36   Post-op Diagnosis:  Same  Significant pelvic adhesive disease   Procedure: Laparoscopic converted to total abdominal hysterectomy  Bilateral salpingectomy  Significant lysis of adhesions  Drainage of left ovarian cysts (2)  Cystoscopy     The patient is doing well with no major issues.     Objective   Vitals:    24 1354   BP: 120/74   Weight: 92.1 kg (203 lb)   Height: 162.6 cm (64\")     Physical Exam:  Incision(s): Well-healing, no signs of infection or separation  Reassuring postoperative exam       Medical Decision Making:    I have reviewed the operative note.  I have reviewed the postoperative labs where appropriate.    Assessment   JACK, BS, JENNIFER  Post-op evaluation     Plan    No orders of the defined types were placed in this encounter.      Medication Management: None    Patient is meeting all post-op milestones.  Pathology reviewed - benign tissue  Surgical findings discussed.  Postoperative precautions and restrictions reviewed.    RTC for full postoperative exam in 6 weeks.    Ruben Malagon MD  Obstetrics and Gynecology  [facility]    "

## 2024-06-05 ENCOUNTER — OFFICE VISIT (OUTPATIENT)
Dept: OBSTETRICS AND GYNECOLOGY | Facility: CLINIC | Age: 44
End: 2024-06-05
Payer: COMMERCIAL

## 2024-06-05 VITALS
HEIGHT: 64 IN | BODY MASS INDEX: 33.32 KG/M2 | SYSTOLIC BLOOD PRESSURE: 108 MMHG | WEIGHT: 195.2 LBS | DIASTOLIC BLOOD PRESSURE: 64 MMHG

## 2024-06-05 DIAGNOSIS — D50.9 IRON DEFICIENCY ANEMIA, UNSPECIFIED IRON DEFICIENCY ANEMIA TYPE: ICD-10-CM

## 2024-06-05 DIAGNOSIS — Z90.710 S/P TAH (TOTAL ABDOMINAL HYSTERECTOMY): ICD-10-CM

## 2024-06-05 DIAGNOSIS — Z09 POSTOPERATIVE FOLLOW-UP: Primary | ICD-10-CM

## 2024-06-05 DIAGNOSIS — R53.83 FATIGUE, UNSPECIFIED TYPE: ICD-10-CM

## 2024-06-05 PROCEDURE — 99024 POSTOP FOLLOW-UP VISIT: CPT | Performed by: PHYSICIAN ASSISTANT

## 2024-06-05 RX ORDER — TRAZODONE HYDROCHLORIDE 100 MG/1
TABLET ORAL
COMMUNITY
Start: 2023-12-01

## 2024-06-05 NOTE — PROGRESS NOTES
Subjective   Chief Complaint   Patient presents with    Post-op     6 weeks post-op JACK, C/O fatigue and weakness       Priscilla Brooke is a 43 y.o. year old  presenting to be seen for post op check.  She reports feeling fatigued and weak still.  Had been anemic and stopped taking iron due to constipation.  Has had some issues with constipation but improving  Normal urinary function  Does not feel ready to return to work due to fatigue       Past Medical History:   Diagnosis Date    Abnormal Pap smear of cervix     Anemia     Anxiety     Body piercing     bilateral ears, left nare    Depression     Diverticulosis     Gestational diabetes mellitus 2016    History of transfusion     HPV (human papilloma virus) infection     Hyperlipidemia     Kidney stone     Multiple gestation     Ovarian cyst     Tattoos     Urinary incontinence     Wears glasses         Current Outpatient Medications:     acetaminophen (TYLENOL) 500 MG tablet, Take 2 tablets by mouth Every 8 (Eight) Hours As Needed for Mild Pain., Disp: 60 tablet, Rfl: 0    citalopram (CeleXA) 40 MG tablet, Take 1 tablet by mouth Daily., Disp: 90 tablet, Rfl: 1    hydrocortisone (ANUSOL-HC) 2.5 % rectal cream, Insert  into the rectum 2 (Two) Times a Day., Disp: 30 g, Rfl: 1    ibuprofen (ADVIL,MOTRIN) 800 MG tablet, Take 1 tablet by mouth Every 8 (Eight) Hours As Needed for Mild Pain for up to 30 doses., Disp: 30 tablet, Rfl: 0    naproxen (NAPROSYN) 500 MG tablet, Take 1 tablet by mouth 2 (Two) Times a Day With Meals., Disp: 270 tablet, Rfl: 1    Tirzepatide (MOUNJARO) 2.5 MG/0.5ML solution pen-injector pen, Inject 0.5 mL under the skin into the appropriate area as directed 1 (One) Time Per Week., Disp: , Rfl:     traZODone (DESYREL) 100 MG tablet, , Disp: , Rfl:     cyanocobalamin 1000 MCG/ML injection, Inject 1 mL into the appropriate muscle as directed by prescriber Every 28 (Twenty-Eight) Days. (Patient not taking: Reported on 2024),  "Disp: 3 mL, Rfl: 3   Allergies   Allergen Reactions    Latex     Adhesive Tape Rash      Past Surgical History:   Procedure Laterality Date     SECTION       SECTION WITH TUBAL  7/15/10    COLONOSCOPY  2012    EAR TUBES      ENDOMETRIAL ABLATION      TOTAL LAPAROSCOPIC HYSTERECTOMY N/A 2024    Procedure: ATTEMPTED TOTAL LAPAROSCOPIC HYSTERECTOMY, CONVERTED TO OPEN ABDOMINAL HYSTERECTOMY, BILATERAL SALPINGECTOMY, SIGNIFICANT LYSIS OF ADHESIONS, DRAINAGE OF LEFT OVARIAN CYSTS, CYSTOSCOPY;  Surgeon: Ruben Malagon MD;  Location: [facility];  Service: Obstetrics/Gynecology;  Laterality: N/A;    TUBAL ABDOMINAL LIGATION      UPPER GASTROINTESTINAL ENDOSCOPY        Social History     Socioeconomic History    Marital status:    Tobacco Use    Smoking status: Former     Current packs/day: 0.00     Types: Cigarettes     Quit date: 2005     Years since quittin.0    Smokeless tobacco: Never   Vaping Use    Vaping status: Never Used   Substance and Sexual Activity    Alcohol use: Yes     Alcohol/week: 1.0 - 2.0 standard drink of alcohol     Types: 1 - 2 Glasses of wine per week     Comment: Occasionally    Drug use: No    Sexual activity: Yes     Partners: Female     Birth control/protection: Hysterectomy      Family History   Problem Relation Age of Onset    Coronary artery disease Father     Hypertension Father     Colon cancer Paternal Grandmother     Stroke Paternal Grandmother        Review of Systems   Constitutional:  Positive for fatigue. Negative for chills, diaphoresis and fever.   Gastrointestinal:  Positive for constipation. Negative for diarrhea, nausea and vomiting.   Genitourinary:  Negative for difficulty urinating, dysuria, menstrual problem, pelvic pain, vaginal bleeding and vaginal discharge.           Objective   /64   Ht 162.6 cm (64\")   Wt 88.5 kg (195 lb 3.2 oz)   BMI 33.51 kg/m²     Physical Exam  Constitutional:       Appearance: Normal " appearance. She is well-developed and well-groomed.   Eyes:      General: Lids are normal.      Extraocular Movements: Extraocular movements intact.      Conjunctiva/sclera: Conjunctivae normal.   Abdominal:      General: There is no distension.      Palpations: Abdomen is soft.      Tenderness: There is no abdominal tenderness.      Comments: Incisions healed well   Neurological:      General: No focal deficit present.      Mental Status: She is alert and oriented to person, place, and time.   Psychiatric:         Attention and Perception: Attention normal.         Mood and Affect: Mood normal.         Speech: Speech normal.         Behavior: Behavior is cooperative.            Result Review :                   Assessment and Plan  Diagnoses and all orders for this visit:    1. Postoperative follow-up (Primary)    2. S/P JACK (total abdominal hysterectomy)    3. Fatigue, unspecified type  -     CBC & Differential    4. Iron deficiency anemia, unspecified iron deficiency anemia type  -     CBC & Differential      Patient Instructions   Check CBC  RTW at 8 weeks post op  RTO 1 year or prn           This note was electronically signed.    Carla Moore PA-C   June 5, 2024

## 2024-06-06 LAB
BASOPHILS # BLD AUTO: 0.04 10*3/MM3 (ref 0–0.2)
BASOPHILS NFR BLD AUTO: 0.5 % (ref 0–1.5)
EOSINOPHIL # BLD AUTO: 0.25 10*3/MM3 (ref 0–0.4)
EOSINOPHIL NFR BLD AUTO: 3.2 % (ref 0.3–6.2)
ERYTHROCYTE [DISTWIDTH] IN BLOOD BY AUTOMATED COUNT: 11.8 % (ref 12.3–15.4)
HCT VFR BLD AUTO: 37.6 % (ref 34–46.6)
HGB BLD-MCNC: 12.2 G/DL (ref 12–15.9)
IMM GRANULOCYTES # BLD AUTO: 0.02 10*3/MM3 (ref 0–0.05)
IMM GRANULOCYTES NFR BLD AUTO: 0.3 % (ref 0–0.5)
LYMPHOCYTES # BLD AUTO: 2.86 10*3/MM3 (ref 0.7–3.1)
LYMPHOCYTES NFR BLD AUTO: 36.6 % (ref 19.6–45.3)
MCH RBC QN AUTO: 29.4 PG (ref 26.6–33)
MCHC RBC AUTO-ENTMCNC: 32.4 G/DL (ref 31.5–35.7)
MCV RBC AUTO: 90.6 FL (ref 79–97)
MONOCYTES # BLD AUTO: 0.45 10*3/MM3 (ref 0.1–0.9)
MONOCYTES NFR BLD AUTO: 5.8 % (ref 5–12)
NEUTROPHILS # BLD AUTO: 4.19 10*3/MM3 (ref 1.7–7)
NEUTROPHILS NFR BLD AUTO: 53.6 % (ref 42.7–76)
NRBC BLD AUTO-RTO: 0 /100 WBC (ref 0–0.2)
PLATELET # BLD AUTO: 317 10*3/MM3 (ref 140–450)
RBC # BLD AUTO: 4.15 10*6/MM3 (ref 3.77–5.28)
WBC # BLD AUTO: 7.81 10*3/MM3 (ref 3.4–10.8)

## 2024-07-01 ENCOUNTER — TELEPHONE (OUTPATIENT)
Dept: OBSTETRICS AND GYNECOLOGY | Facility: CLINIC | Age: 44
End: 2024-07-01

## 2024-07-01 NOTE — TELEPHONE ENCOUNTER
Provider: DR. CORTEZ    Caller: CODY ALFONSO    Relationship to Patient: SELF    Pharmacy:     Phone Number: [phone]    Reason for Call: DISABILITY/FMLA     When was the patient last seen: 06.05.24    PATIENT CALLING IN CHECKING THE STATUS OF HER DISABILITY/FMLA PAPER WORK TO SEE IF IT HAD BEEN UPDATED AND RE-FAXED .[phone]    PATIENT CAN BE REACHED AT  [phone]    THANK YOU

## 2025-02-18 ENCOUNTER — TELEPHONE (OUTPATIENT)
Dept: FAMILY MEDICINE CLINIC | Facility: CLINIC | Age: 45
End: 2025-02-18
Payer: COMMERCIAL

## 2025-02-18 RX ORDER — METRONIDAZOLE 500 MG/1
500 TABLET ORAL 2 TIMES DAILY
Qty: 14 TABLET | Refills: 0 | Status: SHIPPED | OUTPATIENT
Start: 2025-02-18 | End: 2025-02-25

## 2025-02-18 RX ORDER — FLUCONAZOLE 150 MG/1
150 TABLET ORAL ONCE
Qty: 1 TABLET | Refills: 0 | Status: SHIPPED | OUTPATIENT
Start: 2025-02-18 | End: 2025-02-18

## 2025-02-18 NOTE — TELEPHONE ENCOUNTER
Pt reports increased vaginal discharge. Similar to previous episodes of BV. Requesting tx with flagyl which has been effective in past and she has tolerated well.     Patient was encouraged to keep me informed of any acute changes, lack of improvement, or any new concerning symptoms.

## 2025-04-11 ENCOUNTER — OFFICE VISIT (OUTPATIENT)
Dept: FAMILY MEDICINE CLINIC | Facility: CLINIC | Age: 45
End: 2025-04-11
Payer: COMMERCIAL

## 2025-04-11 VITALS
SYSTOLIC BLOOD PRESSURE: 122 MMHG | HEART RATE: 75 BPM | HEIGHT: 65 IN | DIASTOLIC BLOOD PRESSURE: 76 MMHG | BODY MASS INDEX: 26.66 KG/M2 | OXYGEN SATURATION: 96 % | RESPIRATION RATE: 16 BRPM | WEIGHT: 160 LBS

## 2025-04-11 DIAGNOSIS — M54.2 CERVICAL PAIN (NECK): ICD-10-CM

## 2025-04-11 DIAGNOSIS — Z76.89 ENCOUNTER TO ESTABLISH CARE WITH NEW DOCTOR: Primary | ICD-10-CM

## 2025-04-11 DIAGNOSIS — F32.A ANXIETY AND DEPRESSION: ICD-10-CM

## 2025-04-11 DIAGNOSIS — Z00.00 ANNUAL PHYSICAL EXAM: ICD-10-CM

## 2025-04-11 DIAGNOSIS — M25.511 CHRONIC RIGHT SHOULDER PAIN: ICD-10-CM

## 2025-04-11 DIAGNOSIS — G89.29 CHRONIC RIGHT SHOULDER PAIN: ICD-10-CM

## 2025-04-11 DIAGNOSIS — F41.9 ANXIETY AND DEPRESSION: ICD-10-CM

## 2025-04-11 DIAGNOSIS — N83.202 CYSTS OF BOTH OVARIES: ICD-10-CM

## 2025-04-11 DIAGNOSIS — Z13.0 SCREENING FOR DEFICIENCY ANEMIA: ICD-10-CM

## 2025-04-11 DIAGNOSIS — G89.29 CHRONIC NECK PAIN: Primary | ICD-10-CM

## 2025-04-11 DIAGNOSIS — Z13.220 SCREENING FOR LIPID DISORDERS: ICD-10-CM

## 2025-04-11 DIAGNOSIS — Z13.228 SCREENING FOR METABOLIC DISORDER: ICD-10-CM

## 2025-04-11 DIAGNOSIS — K59.04 CHRONIC IDIOPATHIC CONSTIPATION: ICD-10-CM

## 2025-04-11 DIAGNOSIS — F43.10 PTSD (POST-TRAUMATIC STRESS DISORDER): ICD-10-CM

## 2025-04-11 DIAGNOSIS — Z13.1 SCREENING FOR DIABETES MELLITUS: ICD-10-CM

## 2025-04-11 DIAGNOSIS — Z13.29 SCREENING FOR THYROID DISORDER: ICD-10-CM

## 2025-04-11 DIAGNOSIS — M54.2 CHRONIC NECK PAIN: Primary | ICD-10-CM

## 2025-04-11 DIAGNOSIS — M50.30 DEGENERATIVE CERVICAL DISC: ICD-10-CM

## 2025-04-11 DIAGNOSIS — Z79.890 HORMONE REPLACEMENT THERAPY (HRT): ICD-10-CM

## 2025-04-11 DIAGNOSIS — N83.201 CYSTS OF BOTH OVARIES: ICD-10-CM

## 2025-04-11 RX ORDER — SPIRONOLACTONE 25 MG/1
1 TABLET ORAL DAILY
COMMUNITY
Start: 2025-01-24
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
## 2025-04-15 ENCOUNTER — TELEPHONE (OUTPATIENT)
Dept: FAMILY MEDICINE CLINIC | Facility: CLINIC | Age: 45
End: 2025-04-15
Payer: COMMERCIAL

## 2025-04-21 ENCOUNTER — OFFICE VISIT (OUTPATIENT)
Dept: FAMILY MEDICINE CLINIC | Facility: CLINIC | Age: 45
End: 2025-04-21
Payer: COMMERCIAL

## 2025-04-21 VITALS
BODY MASS INDEX: 26.66 KG/M2 | RESPIRATION RATE: 16 BRPM | TEMPERATURE: 97.6 F | DIASTOLIC BLOOD PRESSURE: 76 MMHG | WEIGHT: 160 LBS | HEART RATE: 81 BPM | HEIGHT: 65 IN | OXYGEN SATURATION: 95 % | SYSTOLIC BLOOD PRESSURE: 114 MMHG

## 2025-04-21 DIAGNOSIS — J06.9 VIRAL URI WITH COUGH: Primary | ICD-10-CM

## 2025-04-21 PROCEDURE — 99213 OFFICE O/P EST LOW 20 MIN: CPT | Performed by: NURSE PRACTITIONER

## 2025-04-21 NOTE — PROGRESS NOTES
"      Subjective     Chief Complaint:  nasal congestion     History of Present Illness:   Sick x 3 days, nasal congestion, nasal congestion, ear pain, does have tubes, left worse than right, cough, aching, some nausea,   Has taken motrin, ziacam,   No fever  + sick contacts      Review of Systems  As above      I have reviewed and/or updated the patient's past medical, surgical, family, social history and problem list as appropriate.     Medications:    Current Outpatient Medications:     acetaminophen (TYLENOL) 500 MG tablet, Take 2 tablets by mouth Every 8 (Eight) Hours As Needed for Mild Pain., Disp: 60 tablet, Rfl: 0    citalopram (CeleXA) 40 MG tablet, Take 1 tablet by mouth Daily. (Patient taking differently: Take 0.5 tablets by mouth Daily.), Disp: 90 tablet, Rfl: 1    hydrocortisone (ANUSOL-HC) 2.5 % rectal cream, Insert  into the rectum 2 (Two) Times a Day., Disp: 30 g, Rfl: 1    ibuprofen (ADVIL,MOTRIN) 800 MG tablet, Take 1 tablet by mouth Every 8 (Eight) Hours As Needed for Mild Pain for up to 30 doses., Disp: 30 tablet, Rfl: 0    linaclotide (LINZESS) 145 MCG capsule capsule, Take 1 capsule by mouth Every Morning Before Breakfast., Disp: 30 capsule, Rfl: 0    naproxen (NAPROSYN) 500 MG tablet, Take 1 tablet by mouth 2 (Two) Times a Day With Meals., Disp: 270 tablet, Rfl: 1    spironolactone (ALDACTONE) 25 MG tablet, Take 1 tablet by mouth Daily., Disp: , Rfl:     Tirzepatide 2.5 MG/0.5ML solution auto-injector, Inject 2.5 mg under the skin into the appropriate area as directed 1 (One) Time Per Week., Disp: 0.5 mL, Rfl: 0    traZODone (DESYREL) 100 MG tablet, , Disp: , Rfl:     Allergies:  Allergies   Allergen Reactions    Latex     Adhesive Tape Rash       Objective     Vital Signs:   Vitals:    04/21/25 0953   BP: 114/76   Pulse: 81   Resp: 16   Temp: 97.6 °F (36.4 °C)   SpO2: 95%   Weight: 72.6 kg (160 lb)   Height: 163.8 cm (64.5\")     Body mass index is 27.04 kg/m².    Physical Exam:    Physical " Exam  Constitutional:       Appearance: Normal appearance. She is well-developed.   HENT:      Head: Normocephalic and atraumatic.      Right Ear: Tympanic membrane, ear canal and external ear normal.      Left Ear: Tympanic membrane, ear canal and external ear normal.      Nose: Nose normal.      Mouth/Throat:      Pharynx: Uvula midline.   Eyes:      Pupils: Pupils are equal, round, and reactive to light.   Cardiovascular:      Rate and Rhythm: Normal rate and regular rhythm.      Heart sounds: Normal heart sounds. No murmur heard.     No friction rub. No gallop.   Pulmonary:      Effort: Pulmonary effort is normal.      Breath sounds: Normal breath sounds.   Abdominal:      General: Bowel sounds are normal.      Palpations: Abdomen is soft.      Tenderness: There is no abdominal tenderness.   Musculoskeletal:      Cervical back: Neck supple.   Lymphadenopathy:      Head:      Right side of head: No submental, submandibular, tonsillar, preauricular or posterior auricular adenopathy.      Left side of head: No submental, submandibular, tonsillar, preauricular or posterior auricular adenopathy.      Cervical: No cervical adenopathy.   Skin:     General: Skin is warm and dry.   Neurological:      General: No focal deficit present.      Mental Status: She is alert and oriented to person, place, and time.   Psychiatric:         Mood and Affect: Mood normal.         Behavior: Behavior normal.         Assessment / Plan     Assessment/Plan:   Problem List Items Addressed This Visit    None  Visit Diagnoses         Viral URI with cough    -  Primary          - symptoms consistent with viral illness  - supportive care discussed  - RTC if no improvement      Discussed plan of care in detail with pt today; pt verb understanding and agrees.    Follow up:  As needed    Electronically signed by CRIS Sims   04/21/2025 10:38 EDT      Please note that portions of this note were completed with a voice recognition  program.

## 2025-04-23 DIAGNOSIS — H65.93 MIDDLE EAR EFFUSION, BILATERAL: ICD-10-CM

## 2025-04-23 DIAGNOSIS — J01.10 ACUTE NON-RECURRENT FRONTAL SINUSITIS: Primary | ICD-10-CM

## 2025-04-23 RX ORDER — CEFTRIAXONE 1 G/1
1 INJECTION, POWDER, FOR SOLUTION INTRAMUSCULAR; INTRAVENOUS ONCE
Status: COMPLETED | OUTPATIENT
Start: 2025-04-23 | End: 2025-04-23

## 2025-04-23 RX ORDER — PREDNISONE 20 MG/1
20 TABLET ORAL DAILY
Qty: 5 TABLET | Refills: 0 | Status: SHIPPED | OUTPATIENT
Start: 2025-04-23 | End: 2025-04-28

## 2025-04-23 RX ORDER — METHYLPREDNISOLONE ACETATE 40 MG/ML
40 INJECTION, SUSPENSION INTRA-ARTICULAR; INTRALESIONAL; INTRAMUSCULAR; SOFT TISSUE ONCE
Status: COMPLETED | OUTPATIENT
Start: 2025-04-23 | End: 2025-04-23

## 2025-04-23 RX ADMIN — CEFTRIAXONE 1 G: 1 INJECTION, POWDER, FOR SOLUTION INTRAMUSCULAR; INTRAVENOUS at 08:28

## 2025-04-23 RX ADMIN — METHYLPREDNISOLONE ACETATE 40 MG: 40 INJECTION, SUSPENSION INTRA-ARTICULAR; INTRALESIONAL; INTRAMUSCULAR; SOFT TISSUE at 08:29

## 2025-04-28 ENCOUNTER — TELEPHONE (OUTPATIENT)
Dept: BEHAVIORAL HEALTH | Facility: CLINIC | Age: 45
End: 2025-04-28
Payer: COMMERCIAL

## 2025-04-28 NOTE — TELEPHONE ENCOUNTER
Patient requesting diflucan for yeast inf symptoms, symptoms started this weekend, req 2 pills, please advise. WK

## 2025-04-29 DIAGNOSIS — B37.31 VAGINAL CANDIDIASIS: Primary | ICD-10-CM

## 2025-04-29 RX ORDER — FLUCONAZOLE 150 MG/1
150 TABLET ORAL
Qty: 2 TABLET | Refills: 0 | Status: SHIPPED | OUTPATIENT
Start: 2025-04-29

## 2025-05-06 DIAGNOSIS — G47.00 INSOMNIA, UNSPECIFIED TYPE: Primary | ICD-10-CM

## 2025-05-06 NOTE — TELEPHONE ENCOUNTER
Rx Refill Note  Requested Prescriptions     Pending Prescriptions Disp Refills    traZODone (DESYREL) 100 MG tablet        Last office visit with prescribing clinician: 4/11/2025   Last telemedicine visit with prescribing clinician: Visit date not found   Next office visit with prescribing clinician: 6/6/2025                         Would you like a call back once the refill request has been completed: [] Yes [] No    If the office needs to give you a call back, can they leave a voicemail: [] Yes [] No    Linda Moeller CMA  05/06/25, 09:09 EDT

## 2025-05-06 NOTE — TELEPHONE ENCOUNTER
Patient previously rx'd this by former PCP, Dr Frederick out of office, can you advise if it is ok to refill? Uses the  [facility]. WK

## 2025-05-07 RX ORDER — TRAZODONE HYDROCHLORIDE 100 MG/1
100 TABLET ORAL NIGHTLY
Qty: 90 TABLET | Refills: 0 | Status: SHIPPED | OUTPATIENT
Start: 2025-05-07

## 2025-05-14 ENCOUNTER — OFFICE VISIT (OUTPATIENT)
Dept: BEHAVIORAL HEALTH | Facility: CLINIC | Age: 45
End: 2025-05-14
Payer: COMMERCIAL

## 2025-05-14 VITALS
DIASTOLIC BLOOD PRESSURE: 72 MMHG | HEIGHT: 65 IN | WEIGHT: 160 LBS | SYSTOLIC BLOOD PRESSURE: 114 MMHG | HEART RATE: 78 BPM | BODY MASS INDEX: 26.66 KG/M2 | OXYGEN SATURATION: 98 %

## 2025-05-14 DIAGNOSIS — F90.0 ATTENTION DEFICIT HYPERACTIVITY DISORDER (ADHD), INATTENTIVE TYPE, MODERATE: Primary | ICD-10-CM

## 2025-05-14 DIAGNOSIS — F43.10 POST TRAUMATIC STRESS DISORDER (PTSD): ICD-10-CM

## 2025-05-14 DIAGNOSIS — Z51.81 ENCOUNTER FOR THERAPEUTIC DRUG MONITORING: ICD-10-CM

## 2025-05-14 DIAGNOSIS — F33.1 MODERATE EPISODE OF RECURRENT MAJOR DEPRESSIVE DISORDER: ICD-10-CM

## 2025-05-14 DIAGNOSIS — F41.1 GENERALIZED ANXIETY DISORDER: ICD-10-CM

## 2025-05-14 PROCEDURE — 90792 PSYCH DIAG EVAL W/MED SRVCS: CPT

## 2025-05-14 RX ORDER — DESVENLAFAXINE 25 MG/1
25 TABLET, EXTENDED RELEASE ORAL DAILY
Qty: 90 TABLET | Refills: 0 | Status: SHIPPED | OUTPATIENT
Start: 2025-05-14

## 2025-05-14 RX ORDER — LISDEXAMFETAMINE DIMESYLATE 30 MG/1
30 CAPSULE ORAL EVERY MORNING
Qty: 30 CAPSULE | Refills: 0 | Status: SHIPPED | OUTPATIENT
Start: 2025-05-14

## 2025-05-21 LAB — DRUGS UR: NORMAL

## 2025-05-28 ENCOUNTER — TELEPHONE (OUTPATIENT)
Dept: FAMILY MEDICINE CLINIC | Facility: CLINIC | Age: 45
End: 2025-05-28
Payer: COMMERCIAL

## 2025-05-28 DIAGNOSIS — K64.9 HEMORRHOIDS, UNSPECIFIED HEMORRHOID TYPE: Primary | ICD-10-CM

## 2025-05-28 NOTE — TELEPHONE ENCOUNTER
Patient called reporting that she has a thrombosed hemorrhoid, noticed last week. H/O internal and external hemorrhoid with previous hemorrhoidectomy. Requesting referral to Gen Surg. Please advise. WK

## 2025-05-29 ENCOUNTER — OFFICE VISIT (OUTPATIENT)
Dept: UROLOGY | Facility: CLINIC | Age: 45
End: 2025-05-29
Payer: COMMERCIAL

## 2025-05-29 VITALS
OXYGEN SATURATION: 94 % | DIASTOLIC BLOOD PRESSURE: 74 MMHG | HEART RATE: 75 BPM | BODY MASS INDEX: 27.14 KG/M2 | HEIGHT: 64 IN | WEIGHT: 159 LBS | TEMPERATURE: 97.3 F | SYSTOLIC BLOOD PRESSURE: 122 MMHG

## 2025-05-29 DIAGNOSIS — N39.41 URGE INCONTINENCE OF URINE: ICD-10-CM

## 2025-05-29 DIAGNOSIS — K59.00 CONSTIPATION, UNSPECIFIED CONSTIPATION TYPE: ICD-10-CM

## 2025-05-29 DIAGNOSIS — N39.3 STRESS INCONTINENCE: ICD-10-CM

## 2025-05-29 LAB
BILIRUB BLD-MCNC: NEGATIVE MG/DL
CLARITY, POC: CLEAR
COLOR UR: YELLOW
EXPIRATION DATE: ABNORMAL
GLUCOSE UR STRIP-MCNC: NEGATIVE MG/DL
KETONES UR QL: NEGATIVE
LEUKOCYTE EST, POC: NEGATIVE
Lab: ABNORMAL
NITRITE UR-MCNC: NEGATIVE MG/ML
PH UR: 5.5 [PH] (ref 5–8)
PROT UR STRIP-MCNC: ABNORMAL MG/DL
RBC # UR STRIP: NEGATIVE /UL
SP GR UR: 1.02 (ref 1–1.03)
UROBILINOGEN UR QL: NORMAL

## 2025-05-29 RX ORDER — MIRABEGRON 25 MG/1
25 TABLET, FILM COATED, EXTENDED RELEASE ORAL DAILY
Qty: 90 TABLET | Refills: 3 | Status: SHIPPED | OUTPATIENT
Start: 2025-05-29
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
## 2025-05-30 ENCOUNTER — OFFICE VISIT (OUTPATIENT)
Dept: FAMILY MEDICINE CLINIC | Facility: CLINIC | Age: 45
End: 2025-05-30
Payer: COMMERCIAL

## 2025-05-30 VITALS
WEIGHT: 159 LBS | HEART RATE: 58 BPM | DIASTOLIC BLOOD PRESSURE: 74 MMHG | RESPIRATION RATE: 16 BRPM | SYSTOLIC BLOOD PRESSURE: 124 MMHG | HEIGHT: 64 IN | OXYGEN SATURATION: 98 % | BODY MASS INDEX: 27.14 KG/M2

## 2025-05-30 DIAGNOSIS — L23.7 ALLERGIC DERMATITIS DUE TO POISON OAK: Primary | ICD-10-CM

## 2025-05-30 RX ORDER — METHYLPREDNISOLONE ACETATE 40 MG/ML
80 INJECTION, SUSPENSION INTRA-ARTICULAR; INTRALESIONAL; INTRAMUSCULAR; SOFT TISSUE ONCE
Status: DISCONTINUED | OUTPATIENT
Start: 2025-05-30 | End: 2025-05-30

## 2025-05-30 RX ORDER — METHYLPREDNISOLONE ACETATE 80 MG/ML
80 INJECTION, SUSPENSION INTRA-ARTICULAR; INTRALESIONAL; INTRAMUSCULAR; SOFT TISSUE ONCE
Status: COMPLETED | OUTPATIENT
Start: 2025-05-30 | End: 2025-05-30

## 2025-05-30 RX ORDER — TRIAMCINOLONE ACETONIDE 0.25 MG/G
1 OINTMENT TOPICAL 2 TIMES DAILY
Qty: 15 G | Refills: 0 | Status: SHIPPED | OUTPATIENT
Start: 2025-05-30

## 2025-05-30 RX ORDER — PREDNISONE 20 MG/1
20 TABLET ORAL DAILY
Qty: 5 TABLET | Refills: 0 | Status: SHIPPED | OUTPATIENT
Start: 2025-05-30 | End: 2025-06-04

## 2025-05-30 RX ADMIN — METHYLPREDNISOLONE ACETATE 80 MG: 80 INJECTION, SUSPENSION INTRA-ARTICULAR; INTRALESIONAL; INTRAMUSCULAR; SOFT TISSUE at 17:35

## 2025-05-30 NOTE — PROGRESS NOTES
Same Day Office Visit      Date: 2025   Patient Name: Priscilla Brooke  : 1980   MRN: [id number]     Chief Complaint:    Chief Complaint   Patient presents with    Rash     Face, abdomen, left side, right knee,possible poison oak       History of Present Illness: Priscilla Brooke is a 44 y.o. female who is here today for rash.    Patient reports that she has developed rash on stomach, side, and face that began initially as one spot earlier in the week. Patient reports that this is very itchy. Patient reports that she was working in her garden prior to symptoms and may have been exposed to poison ivy or poison oak.    Subjective     I have reviewed the patients family history, social history, past medical history, past surgical history and have updated it as appropriate.     Medications:     Current Outpatient Medications:     Desvenlafaxine Succinate ER (Pristiq) 25 MG tablet sustained-release 24 hour, Take 1 tablet by mouth Daily., Disp: 90 tablet, Rfl: 0    fluconazole (Diflucan) 150 MG tablet, Take 1 tablet by mouth Every 72 (Seventy-Two) Hours., Disp: 2 tablet, Rfl: 0    hydrocortisone (ANUSOL-HC) 2.5 % rectal cream, Insert  into the rectum 2 (Two) Times a Day., Disp: 30 g, Rfl: 1    ibuprofen (ADVIL,MOTRIN) 800 MG tablet, Take 1 tablet by mouth Every 8 (Eight) Hours As Needed for Mild Pain for up to 30 doses., Disp: 30 tablet, Rfl: 0    linaclotide (LINZESS) 145 MCG capsule capsule, Take 1 capsule by mouth Every Morning Before Breakfast., Disp: 30 capsule, Rfl: 0    lisdexamfetamine (Vyvanse) 30 MG capsule, Take 1 capsule by mouth Every Morning, Disp: 30 capsule, Rfl: 0    Mirabegron ER (Myrbetriq) 25 MG tablet sustained-release 24 hour 24 hr tablet, Take 1 tablet by mouth Daily., Disp: 90 tablet, Rfl: 3    naproxen (NAPROSYN) 500 MG tablet, Take 1 tablet by mouth 2 (Two) Times a Day With Meals., Disp: 270 tablet, Rfl: 1    spironolactone (ALDACTONE) 25 MG tablet, Take 1 tablet by  "mouth Daily., Disp: , Rfl:     Tirzepatide 2.5 MG/0.5ML solution auto-injector, Inject 2.5 mg under the skin into the appropriate area as directed 1 (One) Time Per Week., Disp: 0.5 mL, Rfl: 0    traZODone (DESYREL) 100 MG tablet, Take 1 tablet by mouth Every Night., Disp: 90 tablet, Rfl: 0    predniSONE (DELTASONE) 20 MG tablet, Take 1 tablet by mouth Daily for 5 days., Disp: 5 tablet, Rfl: 0    triamcinolone (KENALOG) 0.025 % ointment, Apply 1 Application topically to the appropriate area as directed 2 (Two) Times a Day., Disp: 15 g, Rfl: 0    Current Facility-Administered Medications:     methylPREDNISolone acetate (DEPO-medrol) injection 80 mg, 80 mg, Intramuscular, Once, Aaron Frederick MD    Allergies:   Allergies   Allergen Reactions    Latex     Adhesive Tape Rash       Objective     Physical Exam:     Vital Signs:   Vitals:    05/30/25 1724   BP: 124/74   Pulse: 58   Resp: 16   SpO2: 98%   Weight: 72.1 kg (159 lb)   Height: 162.6 cm (64\")     Body mass index is 27.29 kg/m².          Physical Exam     General:  Adult female in no acute distress. Alert and oriented. Vitals reviewed and are within normal limits.  Head/ENT: Atraumatic. See skin.  Neck: Anatomy appears symmetrical.   Cardiac: Patient appears well perfused with normal HR.  Pulmonary: No signs of respiratory distress.  Integumentary/Skin: Linear rash areas on stomach, right side of stomach, left side, and right side of face consistent with allergic dermatitis due to urushiol oil. Skin otherwise appears unremarkable from observable skin surfaces.   Neurological: Normal gait and speech.  Behavioral/Psych: Patient behavior/demeanor appears consistent with reported age. Patient is pleasant with normal affect today.      Procedures    Assessment / Plan      1. Allergic dermatitis due to poison oak  - methylPREDNISolone acetate (DEPO-medrol) injection 80 mg  - predniSONE (DELTASONE) 20 MG tablet; Take 1 tablet by mouth Daily for 5 days.  " Dispense: 5 tablet; Refill: 0  - triamcinolone (KENALOG) 0.025 % ointment; Apply 1 Application topically to the appropriate area as directed 2 (Two) Times a Day.  Dispense: 15 g; Refill: 0  Assessment & Plan  1. Allergic dermatitis secondary to poison ivy/poison oak  - Reports a rash on stomach, side, and face, very itchy  - Physical examination shows linear rash areas on stomach, right side of stomach, left side, and right side of face consistent with allergic dermatitis (likely poison ivy or poison oak)  - Administer Depo-Medrol injection  - Follow with prednisone burst  - Provide Triamcinolone ointment for use as needed  - Advise to return if symptoms do not improve or worsen     Patient or patient representative verbalized consent for the use of Ambient Listening during the visit with  Aaron Frederick MD for chart documentation. 5/30/2025  17:32 EDT     Follow Up:   No follow-ups on file.      MD ILEANA PedroE [facility]  [address] [place] [address] [address] [place] [address]  Fax [phone]  Phone [phone]

## 2025-06-02 RX ORDER — PREDNISONE 5 MG/1
1 TABLET ORAL TAKE AS DIRECTED
Qty: 48 TABLET | Refills: 0 | Status: SHIPPED | OUTPATIENT
Start: 2025-06-02

## 2025-06-04 ENCOUNTER — OFFICE VISIT (OUTPATIENT)
Dept: SURGERY | Facility: CLINIC | Age: 45
End: 2025-06-04
Payer: COMMERCIAL

## 2025-06-04 VITALS
HEART RATE: 77 BPM | SYSTOLIC BLOOD PRESSURE: 120 MMHG | WEIGHT: 159 LBS | TEMPERATURE: 98.2 F | BODY MASS INDEX: 27.14 KG/M2 | RESPIRATION RATE: 12 BRPM | OXYGEN SATURATION: 98 % | HEIGHT: 64 IN | DIASTOLIC BLOOD PRESSURE: 72 MMHG

## 2025-06-04 DIAGNOSIS — K64.5 THROMBOSED EXTERNAL HEMORRHOID: Primary | ICD-10-CM

## 2025-06-04 PROCEDURE — 46083 INC THROMBOSED HROID XTRNL: CPT | Performed by: SURGERY

## 2025-06-04 PROCEDURE — 99202 OFFICE O/P NEW SF 15 MIN: CPT | Performed by: SURGERY

## 2025-06-04 RX ORDER — PREDNISONE 5 MG/1
TABLET ORAL
COMMUNITY
Start: 2025-06-02

## 2025-06-04 RX ORDER — LIDOCAINE 50 MG/G
1 OINTMENT TOPICAL
Qty: 50 G | Refills: 0 | Status: SHIPPED | OUTPATIENT
Start: 2025-06-04
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
## 2025-06-06 ENCOUNTER — OFFICE VISIT (OUTPATIENT)
Dept: FAMILY MEDICINE CLINIC | Facility: CLINIC | Age: 45
End: 2025-06-06
Payer: COMMERCIAL

## 2025-06-06 VITALS
HEIGHT: 64 IN | WEIGHT: 159 LBS | HEART RATE: 101 BPM | DIASTOLIC BLOOD PRESSURE: 84 MMHG | OXYGEN SATURATION: 99 % | BODY MASS INDEX: 27.14 KG/M2 | SYSTOLIC BLOOD PRESSURE: 134 MMHG | RESPIRATION RATE: 16 BRPM

## 2025-06-06 DIAGNOSIS — L25.5 DERMATITIS DUE TO PLANT: ICD-10-CM

## 2025-06-06 DIAGNOSIS — F90.9 ATTENTION DEFICIT HYPERACTIVITY DISORDER (ADHD), UNSPECIFIED ADHD TYPE: ICD-10-CM

## 2025-06-06 DIAGNOSIS — K59.04 CHRONIC IDIOPATHIC CONSTIPATION: Primary | ICD-10-CM

## 2025-06-06 DIAGNOSIS — F32.A ANXIETY AND DEPRESSION: ICD-10-CM

## 2025-06-06 DIAGNOSIS — F43.10 PTSD (POST-TRAUMATIC STRESS DISORDER): ICD-10-CM

## 2025-06-06 DIAGNOSIS — Z12.31 ENCOUNTER FOR SCREENING MAMMOGRAM FOR MALIGNANT NEOPLASM OF BREAST: ICD-10-CM

## 2025-06-06 DIAGNOSIS — F41.9 ANXIETY AND DEPRESSION: ICD-10-CM

## 2025-06-06 NOTE — PROGRESS NOTES
Follow Up Office Visit      Date: 2025   Patient Name: Priscilla Brooke  : 1980   MRN: [id number]     Chief Complaint:    Chief Complaint   Patient presents with    Rash     Follow up for rash, symptoms have continued     Anxiety    Depression    PTSD       History of Present Illness: Priscilla Brooke is a 44 y.o. female who is here today for follow up.    Patient reports that her rash did worsen with steroids ultimately extended by covering provider. Patient reports that this is finally starting to improve.    Patient reports that she continues to have chronic constipation. Patient reports that the Linzess continues to not help with this. Patient reports she has had issues with hemorrhoids with rupture occurring on 2025 that patient was seen by general surgery for with procedure done in office. Patient reports that Trulance has helped her previously.    Patient reports that her anxiety, depression, and PTSD are improved since behavioral health has switched patient to Pristiq and Vyvanse.    Patient is following with urology for urinary stress incontinence.    Subjective     I have reviewed the patients family history, social history, past medical history, past surgical history and have updated it as appropriate.     Medications:     Current Outpatient Medications:     Desvenlafaxine Succinate ER (Pristiq) 25 MG tablet sustained-release 24 hour, Take 1 tablet by mouth Daily., Disp: 90 tablet, Rfl: 0    fluconazole (Diflucan) 150 MG tablet, Take 1 tablet by mouth Every 72 (Seventy-Two) Hours., Disp: 2 tablet, Rfl: 0    hydrocortisone (ANUSOL-HC) 2.5 % rectal cream, Insert  into the rectum 2 (Two) Times a Day., Disp: 30 g, Rfl: 1    ibuprofen (ADVIL,MOTRIN) 800 MG tablet, Take 1 tablet by mouth Every 8 (Eight) Hours As Needed for Mild Pain for up to 30 doses., Disp: 30 tablet, Rfl: 0    lidocaine (XYLOCAINE) 5 % ointment, Apply 1 Application topically to the appropriate area as directed  "Every 2 (Two) Hours As Needed for Mild Pain., Disp: 50 g, Rfl: 0    lisdexamfetamine (Vyvanse) 30 MG capsule, Take 1 capsule by mouth Every Morning, Disp: 30 capsule, Rfl: 0    Mirabegron ER (Myrbetriq) 25 MG tablet sustained-release 24 hour 24 hr tablet, Take 1 tablet by mouth Daily., Disp: 90 tablet, Rfl: 3    naproxen (NAPROSYN) 500 MG tablet, Take 1 tablet by mouth 2 (Two) Times a Day With Meals., Disp: 270 tablet, Rfl: 1    predniSONE (DELTASONE) 5 MG tablet, ON DAYS 1-4: TAKE TWO TABLETS BY MOUTH BEFORE BREAKFAST, ONE AFTER LUNCH, ONE AFTER DINNER, AND TWO AT BEDTIME ON DAYS 5-8: TAKE ONE TABLET BY MOUTH BEFORE BREAKFAST, ONE AFTER LUNCH, ONE AFTER DINNER, AND ONE AT BEDTIME ON DAYS 9-12: TAKE ONE TABLET BY MOUTH BEFORE BREAKFAST AND ONE AT BEDTIME, Disp: , Rfl:     predniSONE 5 MG (48) tablet therapy pack dose pack, Take 1 tablet by mouth Take As Directed. Take as directed on package instructions., Disp: 48 tablet, Rfl: 0    spironolactone (ALDACTONE) 25 MG tablet, Take 1 tablet by mouth Daily., Disp: , Rfl:     Tirzepatide 2.5 MG/0.5ML solution auto-injector, Inject 2.5 mg under the skin into the appropriate area as directed 1 (One) Time Per Week., Disp: 0.5 mL, Rfl: 0    traZODone (DESYREL) 100 MG tablet, Take 1 tablet by mouth Every Night., Disp: 90 tablet, Rfl: 0    triamcinolone (KENALOG) 0.025 % ointment, Apply 1 Application topically to the appropriate area as directed 2 (Two) Times a Day., Disp: 15 g, Rfl: 0    Plecanatide 3 MG tablet, Take 1 tablet by mouth Daily., Disp: 30 tablet, Rfl: 1    Allergies:   Allergies   Allergen Reactions    Latex     Adhesive Tape Rash       Objective     Physical Exam:     Vital Signs:   Vitals:    06/06/25 1658   BP: 134/84   Pulse: 101   Resp: 16   SpO2: 99%   Weight: 72.1 kg (159 lb)   Height: 162.6 cm (64\")     Body mass index is 27.29 kg/m².          Physical Exam     General:  Well appearing adult female in no acute distress. Alert and oriented. Vitals " reviewed.  Head/ENT: Atraumatic.   Neck: Anatomy appears symmetrical.   Cardiac: Patient appears well perfused.  Pulmonary: No signs of respiratory distress.  Integumentary/Skin: Improvement of rash on patient face.  Neurological: Normal gait and speech.  Behavioral/Psych: Patient behavior/demeanor appears consistent with reported age. Patient is pleasant with normal affect today.      Procedures      Assessment / Plan      1. Dermatitis due to plant    2. Chronic idiopathic constipation  - Plecanatide 3 MG tablet; Take 1 tablet by mouth Daily.  Dispense: 30 tablet; Refill: 1    3. Anxiety and depression    4. PTSD (post-traumatic stress disorder)    5. Attention deficit hyperactivity disorder (ADHD), unspecified ADHD type    6. Encounter for screening mammogram for malignant neoplasm of breast  - Mammo Screening Digital Tomosynthesis Bilateral With CAD; Future     Assessment & Plan  1. Dermatitis due to plant  - Rash on abdomen, side, and face, thought to be secondary to dermatitis from urushiol oil  - Steroid prescribed, but rash worsened with longer steroid course from alternate provider sent  - Rash now starting to improve, especially on the face  - Continued monitoring recommended    2. Chronic idiopathic constipation  - Previously worked up without obvious etiology found including colonoscopy  - On Linzess without improvement  - Switch treatment to Trulance to see if there is improvement    3. Anxiety/depression/PTSD/ADHD  - Following with behavioral health  - Currently on Pristiq and Vyvanse, doing better  - Continued monitoring recommended    4. Health maintenance  - Recent labs reviewed with patient  - Screening mammogram to be ordered  - Family history of colon cancer. Recent colonoscopy within the past couple of years. Continued monitoring recommended     Patient or patient representative verbalized consent for the use of Ambient Listening during the visit with  Aaron Frederick MD for chart  documentation. 6/8/2025  16:00 EDT     Follow Up:   Return in about 3 months (around 9/6/2025).      MD ABIOLA [facility]  [address] [place] [address] [address] [phone]  Fax [phone]  Phone [phone]

## 2025-06-09 DIAGNOSIS — F90.0 ATTENTION DEFICIT HYPERACTIVITY DISORDER (ADHD), INATTENTIVE TYPE, MODERATE: ICD-10-CM

## 2025-06-09 RX ORDER — LISDEXAMFETAMINE DIMESYLATE 30 MG/1
30 CAPSULE ORAL EVERY MORNING
Qty: 30 CAPSULE | Refills: 0 | Status: SHIPPED | OUTPATIENT
Start: 2025-06-09

## 2025-06-09 RX ORDER — LISDEXAMFETAMINE DIMESYLATE 30 MG/1
30 CAPSULE ORAL EVERY MORNING
Qty: 30 CAPSULE | Refills: 0 | Status: SHIPPED | OUTPATIENT
Start: 2025-06-09 | End: 2025-06-09 | Stop reason: SDUPTHER

## 2025-06-09 NOTE — TELEPHONE ENCOUNTER
Rx Refill Note  Requested Prescriptions     Pending Prescriptions Disp Refills    lisdexamfetamine (Vyvanse) 30 MG capsule 30 capsule 0     Sig: Take 1 capsule by mouth Every Morning      Last office visit with prescribing clinician: 5/14/2025   Last telemedicine visit with prescribing clinician: Visit date not found   Next office visit with prescribing clinician: Visit date not found                         Would you like a call back once the refill request has been completed: [] Yes [] No    If the office needs to give you a call back, can they leave a voicemail: [] Yes [] No    Amalia Gamino MA  06/09/25, 09:40 EDT

## 2025-06-09 NOTE — TELEPHONE ENCOUNTER
Rx Refill Note  Requested Prescriptions     Pending Prescriptions Disp Refills    lisdexamfetamine (Vyvanse) 30 MG capsule 30 capsule 0     Sig: Take 1 capsule by mouth Every Morning      Last office visit with prescribing clinician: 5/14/2025   Last telemedicine visit with prescribing clinician: Visit date not found   Next office visit with prescribing clinician: Visit date not found                         Would you like a call back once the refill request has been completed: [] Yes [] No    If the office needs to give you a call back, can they leave a voicemail: [] Yes [] No    Amalia Gamino MA  06/09/25, 10:24 EDT

## 2025-07-09 DIAGNOSIS — F90.0 ATTENTION DEFICIT HYPERACTIVITY DISORDER (ADHD), INATTENTIVE TYPE, MODERATE: ICD-10-CM

## 2025-07-09 DIAGNOSIS — F41.1 GENERALIZED ANXIETY DISORDER: ICD-10-CM

## 2025-07-09 DIAGNOSIS — F33.1 MODERATE EPISODE OF RECURRENT MAJOR DEPRESSIVE DISORDER: ICD-10-CM

## 2025-07-09 DIAGNOSIS — F43.10 POST TRAUMATIC STRESS DISORDER (PTSD): ICD-10-CM

## 2025-07-09 RX ORDER — LISDEXAMFETAMINE DIMESYLATE 30 MG/1
40 CAPSULE ORAL EVERY MORNING
Qty: 30 CAPSULE | Refills: 0 | Status: CANCELLED | OUTPATIENT
Start: 2025-07-09

## 2025-07-09 RX ORDER — LISDEXAMFETAMINE DIMESYLATE 40 MG/1
40 CAPSULE ORAL EVERY MORNING
Qty: 30 CAPSULE | Refills: 0 | Status: SHIPPED | OUTPATIENT
Start: 2025-07-09

## 2025-07-09 RX ORDER — DESVENLAFAXINE 25 MG/1
25 TABLET, EXTENDED RELEASE ORAL DAILY
Qty: 90 TABLET | Refills: 0 | Status: SHIPPED | OUTPATIENT
Start: 2025-07-09

## 2025-07-09 NOTE — TELEPHONE ENCOUNTER
Patient states you had spoke about increasing to 40mg.       Rx Refill Note  Requested Prescriptions     Pending Prescriptions Disp Refills    lisdexamfetamine (Vyvanse) 30 MG capsule 30 capsule 0     Sig: Take 1 capsule by mouth Every Morning      Last office visit with prescribing clinician: 5/14/2025   Last telemedicine visit with prescribing clinician: Visit date not found   Next office visit with prescribing clinician: Visit date not found                         Would you like a call back once the refill request has been completed: [] Yes [] No    If the office needs to give you a call back, can they leave a voicemail: [] Yes [] No    Amalia Gamino MA  07/09/25, 07:40 EDT

## 2025-07-31 DIAGNOSIS — G47.00 INSOMNIA, UNSPECIFIED TYPE: ICD-10-CM

## 2025-07-31 RX ORDER — TRAZODONE HYDROCHLORIDE 100 MG/1
100 TABLET ORAL NIGHTLY
Qty: 90 TABLET | Refills: 0 | Status: SHIPPED | OUTPATIENT
Start: 2025-07-31

## 2025-08-05 DIAGNOSIS — F43.10 POST TRAUMATIC STRESS DISORDER (PTSD): ICD-10-CM

## 2025-08-05 DIAGNOSIS — F33.1 MODERATE EPISODE OF RECURRENT MAJOR DEPRESSIVE DISORDER: ICD-10-CM

## 2025-08-05 DIAGNOSIS — F90.0 ATTENTION DEFICIT HYPERACTIVITY DISORDER (ADHD), INATTENTIVE TYPE, MODERATE: ICD-10-CM

## 2025-08-05 DIAGNOSIS — F41.1 GENERALIZED ANXIETY DISORDER: ICD-10-CM

## 2025-08-05 RX ORDER — DESVENLAFAXINE 25 MG/1
25 TABLET, EXTENDED RELEASE ORAL DAILY
Qty: 90 TABLET | Refills: 0 | Status: SHIPPED | OUTPATIENT
Start: 2025-08-05

## 2025-08-05 RX ORDER — LISDEXAMFETAMINE DIMESYLATE 40 MG/1
40 CAPSULE ORAL EVERY MORNING
Qty: 30 CAPSULE | Refills: 0 | Status: SHIPPED | OUTPATIENT
Start: 2025-08-05

## 2025-08-11 DIAGNOSIS — N39.41 URGE INCONTINENCE OF URINE: Primary | ICD-10-CM

## 2025-08-11 RX ORDER — VIBEGRON 75 MG/1
75 TABLET, FILM COATED ORAL DAILY
Qty: 30 TABLET | Refills: 11 | Status: SHIPPED | OUTPATIENT
Start: 2025-08-11

## 2025-08-12 ENCOUNTER — TELEPHONE (OUTPATIENT)
Dept: UROLOGY | Facility: CLINIC | Age: 45
End: 2025-08-12
Payer: COMMERCIAL

## 2025-08-20 RX ORDER — DESVENLAFAXINE 50 MG/1
50 TABLET, FILM COATED, EXTENDED RELEASE ORAL DAILY
Qty: 90 TABLET | Refills: 0 | Status: SHIPPED | OUTPATIENT
Start: 2025-08-20

## 2025-08-26 DIAGNOSIS — F43.10 POST TRAUMATIC STRESS DISORDER (PTSD): Primary | ICD-10-CM

## 2025-08-26 DIAGNOSIS — F41.1 GENERALIZED ANXIETY DISORDER: ICD-10-CM

## 2025-08-26 RX ORDER — CLONAZEPAM 0.5 MG/1
0.5 TABLET ORAL 2 TIMES DAILY PRN
Qty: 60 TABLET | Refills: 0 | Status: SHIPPED | OUTPATIENT
Start: 2025-08-26

## 2025-08-27 DIAGNOSIS — M54.31 SCIATICA OF RIGHT SIDE: Primary | ICD-10-CM

## 2025-08-27 RX ORDER — KETOROLAC TROMETHAMINE 30 MG/ML
60 INJECTION, SOLUTION INTRAMUSCULAR; INTRAVENOUS ONCE
Status: COMPLETED | OUTPATIENT
Start: 2025-08-27 | End: 2025-08-27

## 2025-08-27 RX ADMIN — KETOROLAC TROMETHAMINE 60 MG: 30 INJECTION, SOLUTION INTRAMUSCULAR; INTRAVENOUS at 17:39

## (undated) DEVICE — SYR LUERLOK 50ML

## (undated) DEVICE — SUT VICRYL 0 TIES J112T

## (undated) DEVICE — SUT MNCRYL PLS ANTIB UD 3/0 PS2 27IN

## (undated) DEVICE — ANTIBACTERIAL UNDYED BRAIDED (POLYGLACTIN 910), SYNTHETIC ABSORBABLE SUTURE: Brand: COATED VICRYL

## (undated) DEVICE — 2, DISPOSABLE SUCTION/IRRIGATOR WITHOUT DISPOSABLE TIP: Brand: STRYKEFLOW

## (undated) DEVICE — LAPAROSCOPIC DISSECTOR: Brand: DEROYAL

## (undated) DEVICE — ENDOPATH XCEL BLADELESS TROCARS WITH STABILITY SLEEVES: Brand: ENDOPATH XCEL

## (undated) DEVICE — ST IRR CYSTO W/SPK 77IN LF

## (undated) DEVICE — SUT GUT CHRM 2/0 SH 27IN G123H

## (undated) DEVICE — SUT PDS 0 CT 36IN VIO PDP358T

## (undated) DEVICE — ANTIBACTERIAL VIOLET BRAIDED (POLYGLACTIN 910), SYNTHETIC ABSORBABLE SUTURE: Brand: COATED VICRYL

## (undated) DEVICE — HDRST POSTN SLOTTED A/

## (undated) DEVICE — ST TBG PNEUMOCLEAR EVAC SMOKE HIFLO

## (undated) DEVICE — STPLR SKIN SUBCUTICULAR INSORB 2030

## (undated) DEVICE — MANIP UTER ARCH KOHEFFICIENT 3.5CM

## (undated) DEVICE — MARKR SKIN W/RULR

## (undated) DEVICE — SPNG LAP 18X18IN LF STRL PK/5

## (undated) DEVICE — STRIP,CLOSURE,WOUND,MEDI-STRIP,1/2X4: Brand: MEDLINE

## (undated) DEVICE — GLV SURG BIOGEL PI ULTRATOUCH G SZ7.5 LF

## (undated) DEVICE — ENDOPATH XCEL UNIVERSAL TROCAR STABLILITY SLEEVES: Brand: ENDOPATH XCEL

## (undated) DEVICE — SOL IRR NACL 0.9PCT BT 1000ML

## (undated) DEVICE — MANIP UTER RUMI TP 6.7MM 6CM WHT

## (undated) DEVICE — LARGE, DISPOSABLE ALEXIS O C-SECTION PROTECTOR - RETRACTOR: Brand: ALEXIS ® O C-SECTION PROTECTOR - RETRACTOR

## (undated) DEVICE — DRP ADAPT ALLY UTER POSTN SYS 1P/U

## (undated) DEVICE — SUT VIC 0 CT 36IN J958H

## (undated) DEVICE — SLV SCD CALF HEMOFORCE DVT THERP REPROC MD

## (undated) DEVICE — HARMONIC 1100 SHEARS, 36CM SHAFT LENGTH: Brand: HARMONIC

## (undated) DEVICE — SUTURE GUT CHROMIC 3/0 912H

## (undated) DEVICE — ADHS LIQ MASTISOL 2/3ML

## (undated) DEVICE — RICH LAVH: Brand: MEDLINE INDUSTRIES, INC.

## (undated) DEVICE — PAD STEEP TRENDELENBURG W/RAIL STRAP INTEGR ARM PROTECT WING

## (undated) DEVICE — SUT VIC 0 UR6 27IN VCP603H

## (undated) DEVICE — 4-PORT MANIFOLD: Brand: NEPTUNE 2

## (undated) DEVICE — SYR LL TP 10ML STRL

## (undated) DEVICE — CATHETER,FOLEY,100%SILICONE,16FR,10ML,LF: Brand: MEDLINE

## (undated) DEVICE — PLUG,CATHETER,DRAINAGE PROTECTOR,TUBE: Brand: MEDLINE

## (undated) DEVICE — CATH FOLEY LUBRICATH 3WY 18F 30CC

## (undated) DEVICE — GLV SURG SENSICARE PI LF PF 7.5 GRN STRL